# Patient Record
Sex: FEMALE | Race: BLACK OR AFRICAN AMERICAN | Employment: FULL TIME | URBAN - METROPOLITAN AREA
[De-identification: names, ages, dates, MRNs, and addresses within clinical notes are randomized per-mention and may not be internally consistent; named-entity substitution may affect disease eponyms.]

---

## 2017-04-11 DIAGNOSIS — I10 ESSENTIAL HYPERTENSION WITH GOAL BLOOD PRESSURE LESS THAN 140/90: ICD-10-CM

## 2017-04-11 DIAGNOSIS — R73.09 ELEVATED HEMOGLOBIN A1C: Primary | ICD-10-CM

## 2017-04-11 RX ORDER — GLIMEPIRIDE 2 MG/1
2 TABLET ORAL
Qty: 90 TAB | Refills: 0 | Status: SHIPPED | OUTPATIENT
Start: 2017-04-11 | End: 2017-04-13 | Stop reason: SDUPTHER

## 2017-04-13 DIAGNOSIS — R73.09 ELEVATED HEMOGLOBIN A1C: ICD-10-CM

## 2017-04-13 RX ORDER — GLIMEPIRIDE 2 MG/1
2 TABLET ORAL
Qty: 30 TAB | Refills: 0 | Status: SHIPPED | OUTPATIENT
Start: 2017-04-13 | End: 2017-07-06 | Stop reason: SDUPTHER

## 2017-06-19 ENCOUNTER — OFFICE VISIT (OUTPATIENT)
Dept: FAMILY MEDICINE CLINIC | Age: 47
End: 2017-06-19

## 2017-06-19 VITALS
HEART RATE: 102 BPM | BODY MASS INDEX: 39.69 KG/M2 | DIASTOLIC BLOOD PRESSURE: 87 MMHG | TEMPERATURE: 98.8 F | RESPIRATION RATE: 16 BRPM | OXYGEN SATURATION: 97 % | HEIGHT: 63 IN | SYSTOLIC BLOOD PRESSURE: 143 MMHG | WEIGHT: 224 LBS

## 2017-06-19 DIAGNOSIS — N89.8 VAGINAL IRRITATION: Primary | ICD-10-CM

## 2017-06-19 DIAGNOSIS — R82.90 CLOUDY URINE: ICD-10-CM

## 2017-06-19 LAB
BILIRUB UR QL STRIP: NEGATIVE
GLUCOSE UR-MCNC: NEGATIVE MG/DL
KETONES P FAST UR STRIP-MCNC: NORMAL MG/DL
PH UR STRIP: 6 [PH] (ref 4.6–8)
PROT UR QL STRIP: NORMAL MG/DL
SP GR UR STRIP: 1.02 (ref 1–1.03)
UA UROBILINOGEN AMB POC: NORMAL (ref 0.2–1)
URINALYSIS CLARITY POC: CLEAR
URINALYSIS COLOR POC: YELLOW
URINE BLOOD POC: NORMAL
URINE LEUKOCYTES POC: NORMAL
URINE NITRITES POC: NEGATIVE

## 2017-06-19 RX ORDER — FLUCONAZOLE 150 MG/1
150 TABLET ORAL
Qty: 2 TAB | Refills: 0 | Status: SHIPPED | OUTPATIENT
Start: 2017-06-19 | End: 2017-06-23

## 2017-06-19 NOTE — PROGRESS NOTES
Chief Complaint   Patient presents with    Bladder Infection     two weeks.   tried otc meds but did not work

## 2017-06-19 NOTE — PROGRESS NOTES
Subjective  Kathi Su is an 42 y.o. female who presents cloudy urine and vaginal irritation. States that 2 weeks ago, she has whitish vaginal discharge and itching. She took OTC Monistat, one dose. Symptoms improved but did not resolve. Then she started having cloudy urine and her bladder felt achy. Denies fever/chills. Past Medical History - reviewed:  Past Medical History:   Diagnosis Date    Diabetes mellitus     type 2    Diabetes mellitus type 2 in obese [facility]) 2006    Endometriosis     Essential hypertension     Hypertension     Morbid obesity ([address]     Overweight (BMI 25.0-29. 9)          ROS  CONSTITUTIONAL: no fever  CARDIOVASCULAR: no chest pain  RESPIRATORY: no shortness of breath      Physical Exam  Visit Vitals    /87 (BP 1 Location: Right arm, BP Patient Position: Sitting)    Pulse (!) 102    Temp 98.8 °F (37.1 °C) (Oral)    Resp 16    Ht 5' 3\" (1.6 m)    Wt 224 lb (101.6 kg)    SpO2 97%    BMI 39.68 kg/m2       General appearance - alert, well appearing, and in no distress  Chest - clear to auscultation, no wheezes or rhonchi, symmetric air entry  Heart - normal rate, regular rhythm, normal S1, S2, no murmurs  Abdomen - soft, nontender, nondistended   - chaperoned by Kenyon Mejias LPN. Vaginal mucosa pink and moist. Thick, chunky, whitish vaginal discharge present. Assessment/Plan    ICD-10-CM ICD-9-CM    1. Vaginal irritation N89.8 623.9 fluconazole (DIFLUCAN) 150 mg tablet   2. Cloudy urine R82.90 791.9 CULTURE, URINE      AMB POC URINALYSIS DIP STICK AUTO W/O MICRO     Vaginal discharge and itching: Treat for yeast with diflucan. One tab now and then one in 72 hours of symptoms persist.    Cloudy urine: UA remarkable for trace LE, +1 blood , negative nitrites. Send out for culture. Follow-up Disposition:  Return if symptoms worsen or fail to improve. I have discussed the diagnosis with the patient and the intended plan as seen in the above orders. The patient has received an after-visit summary and questions were answered concerning future plans. I have discussed medication side effects and warnings with the patient as well.       Bobo Grijalva MD  Family Medicine Resident

## 2017-06-19 NOTE — PATIENT INSTRUCTIONS

## 2017-06-19 NOTE — MR AVS SNAPSHOT
Visit Information Date & Time Provider Department Dept. Phone Encounter #  
 6/19/2017  6:15 PM Veena Matos, [facility] [phone] [id number] Follow-up Instructions Return if symptoms worsen or fail to improve. Your Appointments 7/6/2017 10:20 AM  
COMPLETE PHYSICAL with Kristopher Figueroa MD  
[facility]) Appt Note: CPE  
 [address] [place] [address] Road  
[phone]  
  
   
 [address] Charu Bentley [address] Upcoming Health Maintenance Date Due Pneumococcal 19-64 Medium Risk (1 of 1 - PPSV23) 11/27/1991 DTaP/Tdap/Td series (1 - Tdap) 11/27/1993 HEMOGLOBIN A1C Q6M 12/14/2016 LIPID PANEL Q1 6/14/2017 INFLUENZA AGE 9 TO ADULT 8/1/2017 PAP AKA CERVICAL CYTOLOGY 7/15/2019 Allergies as of 6/19/2017  Review Complete On: 6/19/2017 By: Venkat Macias MD  
  
 Severity Noted Reaction Type Reactions Shellfish Containing Products High 05/24/2010    Shortness of Breath, Other (comments)  
 laryngeal edema Oxycodone Medium 01/14/2013   Systemic Itching Pt can take lortab Naprosyn [Naproxen]  05/24/2010    Swelling  
 face swells. Takes motrin at home Current Immunizations  Never Reviewed No immunizations on file. Not reviewed this visit You Were Diagnosed With   
  
 Codes Comments Vaginal irritation    -  Primary ICD-10-CM: S83.4 ICD-9-CM: 623.9 Dysuria     ICD-10-CM: R30.0 ICD-9-CM: 652. 1 Vitals BP Pulse Temp Resp Height(growth percentile) Weight(growth percentile) 143/87 (BP 1 Location: Right arm, BP Patient Position: Sitting) (!) 102 98.8 °F (37.1 °C) (Oral) 16 5' 3\" (1.6 m) 224 lb (101.6 kg) SpO2 BMI OB Status Smoking Status 97% 39.68 kg/m2 Having regular periods Never Smoker Vitals History BMI and BSA Data  Body Mass Index Body Surface Area  
 39.68 kg/m 2 2.13 m 2  
  
  
 Preferred Pharmacy Pharmacy Name Phone [facility] [id number] AT [facility] [phone] Your Updated Medication List  
  
   
This list is accurate as of: 6/19/17  6:56 PM.  Always use your most recent med list.  
  
  
  
  
 Blood-Glucose Meter monitoring kit  
by Does Not Apply route. Please supply 1 meter. fluconazole 150 mg tablet Commonly known as:  DIFLUCAN Take 1 Tab by mouth every three (3) days for 2 doses. FDA advises cautious prescribing of oral fluconazole in pregnancy. glimepiride 2 mg tablet Commonly known as:  AMARYL Take 1 Tab by mouth every morning. Appointment needed prior to next refill.  
  
 glucose blood VI test strips strip Commonly known as:  Alton Dueñas Check BS using  test strips with onetouch verio meter  
  
 lisinopril-hydroCHLOROthiazide 20-12.5 mg per tablet Commonly known as:  Pennelope Collet Take 1 Tab by mouth daily. metFORMIN  mg tablet Commonly known as:  GLUCOPHAGE XR Take 3 tabs by mouth daily (one at lunch, two at dinner) Prescriptions Sent to Pharmacy Refills  
 fluconazole (DIFLUCAN) 150 mg tablet 0 Sig: Take 1 Tab by mouth every three (3) days for 2 doses. FDA advises cautious prescribing of oral fluconazole in pregnancy. Class: Normal  
 Pharmacy: [address] Ph #: [phone] Route: Oral  
  
We Performed the Following AMB POC SMEAR, STAIN & Collette Chuck MOUNT [id number] CPT(R)] AMB POC URINALYSIS DIP STICK AUTO W/O MICRO [46688 CPT(R)] CULTURE, URINE [id number] CPT(R)] Follow-up Instructions Return if symptoms worsen or fail to improve. Patient Instructions Vaginal Yeast Infection: Care Instructions Your Care Instructions A vaginal yeast infection is caused by too many yeast cells in the vagina. This is common in women of all ages. Itching, vaginal discharge and irritation, and other symptoms can bother you. But yeast infections don't often cause other health problems. Some medicines can increase your risk of getting a yeast infection. These include antibiotics, birth control pills, hormones, and steroids. You may also be more likely to get a yeast infection if you are pregnant, have diabetes, douche, or wear tight clothes. With treatment, most yeast infections get better in 2 to 3 days. Follow-up care is a key part of your treatment and safety. Be sure to make and go to all appointments, and call your doctor if you are having problems. It's also a good idea to know your test results and keep a list of the medicines you take. How can you care for yourself at home? · Take your medicines exactly as prescribed. Call your doctor if you think you are having a problem with your medicine. · Ask your doctor about over-the-counter (OTC) medicines for yeast infections. They may cost less than prescription medicines. If you use an OTC treatment, read and follow all instructions on the label. · Do not use tampons while using a vaginal cream or suppository. The tampons can absorb the medicine. Use pads instead. · Wear loose cotton clothing. Do not wear nylon or other fabric that holds body heat and moisture close to the skin. · Try sleeping without underwear. · Do not scratch. Relieve itching with a cold pack or a cool bath. · Do not wash your vaginal area more than once a day. Use plain water or a mild, unscented soap. Air-dry the vaginal area. · Change out of wet swimsuits after swimming. · Do not have sex until you have finished your treatment. · Do not douche. When should you call for help? Call your doctor now or seek immediate medical care if: 
· You have unexpected vaginal bleeding. · You have new or increased pain in your vagina or pelvis. Watch closely for changes in your health, and be sure to contact your doctor if: 
· You have a fever. · You are not getting better after 2 days. · Your symptoms come back after you finish your medicines. Where can you learn more? Go to http://dominique-salome.info/. Enter R856 in the search box to learn more about \"Vaginal Yeast Infection: Care Instructions. \" Current as of: October 13, 2016 Content Version: 11.3 © 5589-8371 [employer]. Care instructions adapted under license by [employer] (which disclaims liability or warranty for this information). If you have questions about a medical condition or this instruction, always ask your healthcare professional. [address] any warranty or liability for your use of this information. Introducing \[facility]\"" & HEALTH SERVICES! Dear Shaila Graham: Thank you for requesting a [employer] account. Our records indicate that you already have an active [employer] account. You can access your account anytime at [email or web address]/Site Tour Did you know that you can access your hospital and ER discharge instructions at any time in [employer]? You can also review all of your test results from your hospital stay or ER visit. Additional Information If you have questions, please visit the Frequently Asked Questions section of the [employer] website at [email or web address] Remember, [employer] is NOT to be used for urgent needs. For medical emergencies, dial 911. Now available from your iPhone and Android! Please provide this summary of care documentation to your next provider. Your primary care clinician is listed as [address] [place] [address]. If you have any questions after today's visit, please call [phone].

## 2017-06-21 LAB — BACTERIA UR CULT: NORMAL

## 2017-07-06 ENCOUNTER — OFFICE VISIT (OUTPATIENT)
Dept: FAMILY MEDICINE CLINIC | Age: 47
End: 2017-07-06

## 2017-07-06 VITALS
OXYGEN SATURATION: 99 % | BODY MASS INDEX: 39.51 KG/M2 | TEMPERATURE: 98.5 F | SYSTOLIC BLOOD PRESSURE: 135 MMHG | WEIGHT: 223 LBS | RESPIRATION RATE: 18 BRPM | DIASTOLIC BLOOD PRESSURE: 87 MMHG | HEIGHT: 63 IN | HEART RATE: 91 BPM

## 2017-07-06 DIAGNOSIS — R73.09 ELEVATED HEMOGLOBIN A1C: ICD-10-CM

## 2017-07-06 DIAGNOSIS — I10 ESSENTIAL HYPERTENSION WITH GOAL BLOOD PRESSURE LESS THAN 140/90: ICD-10-CM

## 2017-07-06 DIAGNOSIS — Z01.419 ENCOUNTER FOR WELL WOMAN EXAM WITH ROUTINE GYNECOLOGICAL EXAM: Primary | ICD-10-CM

## 2017-07-06 DIAGNOSIS — E11.9 TYPE 2 DIABETES MELLITUS WITHOUT COMPLICATION, UNSPECIFIED LONG TERM INSULIN USE STATUS: ICD-10-CM

## 2017-07-06 RX ORDER — GLIMEPIRIDE 2 MG/1
2 TABLET ORAL
Qty: 90 TAB | Refills: 2 | Status: SHIPPED | OUTPATIENT
Start: 2017-07-06 | End: 2017-07-18 | Stop reason: SDUPTHER

## 2017-07-06 RX ORDER — LISINOPRIL AND HYDROCHLOROTHIAZIDE 12.5; 2 MG/1; MG/1
1 TABLET ORAL DAILY
Qty: 90 TAB | Refills: 2 | Status: SHIPPED | OUTPATIENT
Start: 2017-07-06 | End: 2018-01-31 | Stop reason: SDUPTHER

## 2017-07-06 NOTE — LETTER
7/10/2017 3:00 PM 
 
Ms. Janet Armas [address] [facility] [address] [id number] Dear Janet Armas: 
 
Please find your most recent results below. Resulted Orders METABOLIC PANEL, COMPREHENSIVE Result Value Ref Range Glucose 152 (H) 65 - 99 mg/dL BUN 9 6 - 24 mg/dL Creatinine 0.66 0.57 - 1.00 mg/dL GFR est non- >59 mL/min/1.73 GFR est  >59 mL/min/1.73  
 BUN/Creatinine ratio 14 9 - 23 Sodium 137 134 - 144 mmol/L Potassium 4.3 3.5 - 5.2 mmol/L Chloride 97 96 - 106 mmol/L  
 CO2 21 18 - 29 mmol/L Calcium 9.8 8.7 - 10.2 mg/dL Protein, total 7.4 6.0 - 8.5 g/dL Albumin 4.1 3.5 - 5.5 g/dL GLOBULIN, TOTAL 3.3 1.5 - 4.5 g/dL A-G Ratio 1.2 1.2 - 2.2 Bilirubin, total 0.4 0.0 - 1.2 mg/dL Alk. phosphatase 55 39 - 117 IU/L  
 AST (SGOT) 22 0 - 40 IU/L  
 ALT (SGPT) 21 0 - 32 IU/L Narrative Performed at:  [address]  [id number] : David Al MD, Phone:  [phone] CBC W/O DIFF Result Value Ref Range WBC 8.4 3.4 - 10.8 x10E3/uL  
 RBC 4.28 3.77 - 5.28 x10E6/uL HGB 13.1 11.1 - 15.9 g/dL HCT 39.2 34.0 - 46.6 % MCV 92 79 - 97 fL  
 MCH 30.6 26.6 - 33.0 pg  
 MCHC 33.4 31.5 - 35.7 g/dL  
 RDW 13.7 12.3 - 15.4 % PLATELET 379 254 - 895 x10E3/uL Narrative Performed at:  [address]  [id number] : David Al MD, Phone:  [phone] LIPID PANEL Result Value Ref Range Cholesterol, total 189 100 - 199 mg/dL Triglyceride 290 (H) 0 - 149 mg/dL HDL Cholesterol 51 >39 mg/dL VLDL, calculated 58 (H) 5 - 40 mg/dL LDL, calculated 80 0 - 99 mg/dL Narrative Performed at:  [address]  [id number] : David Al MD, Phone:  [phone] HEMOGLOBIN A1C WITH EAG Result Value Ref Range Hemoglobin A1c 7.8 (H) 4.8 - 5.6 % Comment:  
            Pre-diabetes: 5.7 - 6.4 Diabetes: >6.4 Glycemic control for adults with diabetes: <7.0 Estimated average glucose 177 mg/dL Narrative Performed at:  [address]  [id number] : Sumaya Islas MD, Phone:  [phone] TSH 3RD GENERATION Result Value Ref Range TSH 1.160 0.450 - 4.500 uIU/mL Narrative Performed at:  [address]  [id number] : Sumaya Islas MD, Phone:  [phone] CVD REPORT Result Value Ref Range INTERPRETATION Note Comment:  
   Supplement report is available. Narrative Performed at:  [facility] [place] [address]  [id number] : Caroline Andrade PhD, Phone:  [phone] DIABETES PATIENT EDUCATION Result Value Ref Range PDF Image Not applicable Narrative Performed at:  [address] [place] [address]  [id number] : Caroline Andrade PhD, Phone:  [phone] RECOMMENDATIONS: 
 
 
Elevated glucose Normal kidney function Normal liver function No anemia Elevated triglycerides -- need to decrease fat intake and increase fiber intake Hgb A1C = 7.8 -- highest it has been in a while -- need to watch diet -- could increase metformin or her other med -- what would you like to do? Normal thyroid function Please call me if you have any questions: [phone] Sincerely, Tre Darby MD

## 2017-07-06 NOTE — PROGRESS NOTES
HPI       Jb Pike is a 42 y.o. female who presents for an annual exam.     Yeast Infx - Pt in clinic 6/19 with a yeast infection, symptoms have all resolved. Diabetes - wants to discuss better diabetes management. Has very busy lifestyle, meals are very sporadic, does not get regular exercise. Checks sugars at least once a day, usually in the 180s, but this past week, has had a lot of high BGs (325, 200s). This morning was 150 before breakfast. Previously saw Portillo Arellano as her endocrinologist but now only sees Dr. Nayla Luther. Has a lot of sinus issues recently, colds and seasonal allergies, says that usually elevates her BG. Denies vision changes, no heart palpitations. - Last A1C: 7.5 (6/14/2016), 7.3 (11/7/2014)    Hypertension - does not do home checks, does have a cuff at home. Encouraged checking BP at home. Endorses having headaches chronically, has not noted any increase in frequency related to BGs. Does not always take lisinopril-HCTZ every day because of the diuretic, says she takes it about 5/7 days of the week. Gyn - Hx of menorrhagia, endometriosis, pt has regular periods, s/p endometrial ablation, continues to have back pains and \"uterus achiness,\" centrally located, while on her period. Well Woman Exam-  - Last mammogram 6/28/2016, normal  - Last Pap 7/15/2016 negative  - No hx of abnormal PAP smears    Health Maintenance  - Immunizations - discussed pneumovax, pt eligible due to DMII, pt will think about it  - Pt asked about colonoscopies, discussed recommendations, pt to wait until age 50    PMHx:  Past Medical History:   Diagnosis Date    Diabetes mellitus     type 2    Diabetes mellitus type 2 in obese [facility]) 2006    Endometriosis     Essential hypertension     Hypertension     Morbid obesity ([address]     Overweight (BMI 25.0-29. 9)      Meds:   Current Outpatient Prescriptions   Medication Sig Dispense Refill    glimepiride (AMARYL) 2 mg tablet Take 1 Tab by mouth every morning. Appointment needed prior to next refill. 30 Tab 0    metFORMIN ER (GLUCOPHAGE XR) 500 mg tablet Take 3 tabs by mouth daily (one at lunch, two at dinner) 90 Tab 0    lisinopril-hydrochlorothiazide (PRINZIDE, ZESTORETIC) 20-12.5 mg per tablet Take 1 Tab by mouth daily. 90 Tab 2    glucose blood VI test strips (ONETOUCH VERIO) strip Check BS using  test strips with onetouch verio meter 100 Strip 2    Blood-Glucose Meter monitoring kit by Does Not Apply route. Please supply 1 meter. 1 Kit 0     Allergies: Allergies   Allergen Reactions    Shellfish Containing Products Shortness of Breath and Other (comments)     laryngeal edema    Oxycodone Itching     Pt can take lortab    Naprosyn [Naproxen] Swelling     face swells. Takes motrin at home        Smoker:  History   Smoking Status    Never Smoker   Smokeless Tobacco    Never Used     ETOH:   History   Alcohol Use No     FH:   Family History   Problem Relation Age of Onset    Diabetes Mother     Hypertension Mother     High Cholesterol Mother     Hypertension Father     High Cholesterol Father     Diabetes Maternal Grandmother     Breast Cancer Maternal Aunt     Breast Cancer Maternal Aunt     Colon Cancer Neg Hx     Ovarian Cancer Neg Hx      ROS:  Review of Systems   Constitutional: Negative for fatigue and fever. HENT: Negative. Negative for sinus pressure, sneezing and sore throat. Respiratory: Negative for cough and shortness of breath. Cardiovascular: Negative for chest pain and palpitations. Gastrointestinal: Negative for abdominal pain, constipation, diarrhea, nausea and vomiting. Endocrine: Negative for polyuria. Genitourinary: Negative for difficulty urinating, dysuria, urgency and vaginal discharge. Musculoskeletal: Negative for back pain. Neurological: Negative for dizziness, light-headedness and headaches.      Physical Exam:  Visit Vitals    /87 (BP 1 Location: Right arm, BP Patient Position: Sitting)    Pulse 91    Temp 98.5 °F (36.9 °C) (Oral)    Resp 18    Ht 5' 3\" (1.6 m)    Wt 223 lb (101.2 kg)    LMP 06/05/2017    SpO2 99%    BMI 39.5 kg/m2       Wt Readings from Last 3 Encounters:   07/06/17 223 lb (101.2 kg)   06/19/17 224 lb (101.6 kg)   07/15/16 223 lb 8 oz (101.4 kg)     BP Readings from Last 3 Encounters:   07/06/17 135/87   06/19/17 143/87   07/15/16 124/85      Physical Exam   Constitutional: She is oriented to person, place, and time. She appears well-developed and well-nourished. HENT:   Head: Normocephalic and atraumatic. Eyes: Conjunctivae and EOM are normal.   Neck: Normal range of motion. Neck supple. Cardiovascular: Normal rate, regular rhythm and normal heart sounds. Pulmonary/Chest: Effort normal and breath sounds normal.   Abdominal: Soft. Bowel sounds are normal.   Genitourinary:   Genitourinary Comments: deferred   Musculoskeletal: Normal range of motion. Neurological: She is alert and oriented to person, place, and time. Vitals reviewed. Breast exam -- normal, no masses, no tenderness, no dimpling, no retractions         Assessment     42 y.o. female with:    ICD-10-CM ICD-9-CM    1. Encounter for well woman exam with routine gynecological exam Z01.419 V72.31 [facility] 3D [facility] W MAMMO BI SCREENING INCL CAD      METABOLIC PANEL, COMPREHENSIVE      CBC W/O DIFF      LIPID PANEL              Plan       Orders Placed This Encounter    [facility] W MAMMO BI SCREENING INCL CAD    METABOLIC PANEL, COMPREHENSIVE    CBC W/O DIFF    LIPID PANEL     Diabetes  - Last A1C 7.5  - Check A1C today  - Continue glimeperide 2mg daily and Metformin 500mg, 1 at lunch and 2 at dinner --> will adjust after we receive A1C results  - Discussed diet/exercise, encouraged checking BG more regularly    Hypertension  - continue lisinopril-HCTZ 20-12.5mg/tablet  - encouraged checking at home    Health Maintenance  - Last Pap one year ago, not needed yet.  Pt on period, pelvic exam deferred. - Mammogram ordered  - Labs: CMP, CBC, lipid panel, TSH  - Breast Exam performed by Dr. Brandie Blackwood [id number] at next visit    Patient discussed and seen with Dr. Debi Jerez    I have discussed the diagnosis with the patient and the intended plan as seen in the above orders. The patient has received an after-visit summary and questions were answered concerning future plans. I have discussed medication side effects and warnings with the patient as well. Padma Lawson MD  Family Medicine Resident, PGY-1    I saw and evaluated the patient, performing the key elements of the service. I discussed the findings, assessment and plan with the resident and agree with the resident's findings and plan as documented in the resident's note.

## 2017-07-06 NOTE — PROGRESS NOTES
Chief Complaint   Patient presents with    Well Woman     1. Have you been to the ER, urgent care clinic since your last visit? Hospitalized since your last visit? No    2. Have you seen or consulted any other health care providers outside of the [address] [place] [address] since your last visit? Include any pap smears or colon screening.  No

## 2017-07-07 LAB
ALBUMIN SERPL-MCNC: 4.1 G/DL (ref 3.5–5.5)
ALBUMIN/GLOB SERPL: 1.2 {RATIO} (ref 1.2–2.2)
ALP SERPL-CCNC: 55 IU/L (ref 39–117)
ALT SERPL-CCNC: 21 IU/L (ref 0–32)
AST SERPL-CCNC: 22 IU/L (ref 0–40)
BILIRUB SERPL-MCNC: 0.4 MG/DL (ref 0–1.2)
BUN SERPL-MCNC: 9 MG/DL (ref 6–24)
BUN/CREAT SERPL: 14 (ref 9–23)
CALCIUM SERPL-MCNC: 9.8 MG/DL (ref 8.7–10.2)
CHLORIDE SERPL-SCNC: 97 MMOL/L (ref 96–106)
CHOLEST SERPL-MCNC: 189 MG/DL (ref 100–199)
CO2 SERPL-SCNC: 21 MMOL/L (ref 18–29)
CREAT SERPL-MCNC: 0.66 MG/DL (ref 0.57–1)
ERYTHROCYTE [DISTWIDTH] IN BLOOD BY AUTOMATED COUNT: 13.7 % (ref 12.3–15.4)
EST. AVERAGE GLUCOSE BLD GHB EST-MCNC: 177 MG/DL
GLOBULIN SER CALC-MCNC: 3.3 G/DL (ref 1.5–4.5)
GLUCOSE SERPL-MCNC: 152 MG/DL (ref 65–99)
HBA1C MFR BLD: 7.8 % (ref 4.8–5.6)
HCT VFR BLD AUTO: 39.2 % (ref 34–46.6)
HDLC SERPL-MCNC: 51 MG/DL
HGB BLD-MCNC: 13.1 G/DL (ref 11.1–15.9)
INTERPRETATION, 910389: NORMAL
LDLC SERPL CALC-MCNC: 80 MG/DL (ref 0–99)
Lab: NORMAL
MCH RBC QN AUTO: 30.6 PG (ref 26.6–33)
MCHC RBC AUTO-ENTMCNC: 33.4 G/DL (ref 31.5–35.7)
MCV RBC AUTO: 92 FL (ref 79–97)
PLATELET # BLD AUTO: 296 X10E3/UL (ref 150–379)
POTASSIUM SERPL-SCNC: 4.3 MMOL/L (ref 3.5–5.2)
PROT SERPL-MCNC: 7.4 G/DL (ref 6–8.5)
RBC # BLD AUTO: 4.28 X10E6/UL (ref 3.77–5.28)
SODIUM SERPL-SCNC: 137 MMOL/L (ref 134–144)
TRIGL SERPL-MCNC: 290 MG/DL (ref 0–149)
TSH SERPL DL<=0.005 MIU/L-ACNC: 1.16 UIU/ML (ref 0.45–4.5)
VLDLC SERPL CALC-MCNC: 58 MG/DL (ref 5–40)
WBC # BLD AUTO: 8.4 X10E3/UL (ref 3.4–10.8)

## 2017-07-09 NOTE — PROGRESS NOTES
Elevated glucose  Normal kidney function  Normal liver function  No anemia  Elevated triglycerides -- need to decrease fat intake and increase fiber intake  Hgb A1C = 7.8 -- highest it has been in a while -- need to watch diet -- could increase metformin or her other med -- what would she like to do?   Normal thyroid function

## 2017-08-18 DIAGNOSIS — E11.9 TYPE 2 DIABETES MELLITUS WITHOUT COMPLICATION, WITHOUT LONG-TERM CURRENT USE OF INSULIN (HCC): Primary | ICD-10-CM

## 2017-08-18 RX ORDER — METFORMIN HYDROCHLORIDE 500 MG/1
500 TABLET, EXTENDED RELEASE ORAL
Qty: 90 TAB | Refills: 3 | Status: SHIPPED | OUTPATIENT
Start: 2017-08-18 | End: 2018-01-12 | Stop reason: SDUPTHER

## 2017-12-29 ENCOUNTER — HOSPITAL ENCOUNTER (OUTPATIENT)
Dept: MAMMOGRAPHY | Age: 47
Discharge: HOME | End: 2017-12-29
Payer: COMMERCIAL

## 2017-12-29 DIAGNOSIS — Z12.39 SCREENING BREAST EXAMINATION: ICD-10-CM

## 2017-12-29 PROCEDURE — 77067 SCR MAMMO BI INCL CAD: CPT

## 2018-01-12 DIAGNOSIS — E11.8 CONTROLLED DIABETES MELLITUS TYPE 2 WITH COMPLICATIONS, UNSPECIFIED LONG TERM INSULIN USE STATUS: Primary | ICD-10-CM

## 2018-01-12 RX ORDER — METFORMIN HYDROCHLORIDE 500 MG/1
TABLET, EXTENDED RELEASE ORAL
Qty: 30 TAB | Refills: 1 | Status: SHIPPED | OUTPATIENT
Start: 2018-01-12 | End: 2018-01-31 | Stop reason: SDUPTHER

## 2018-01-15 ENCOUNTER — LAB ONLY (OUTPATIENT)
Dept: FAMILY MEDICINE CLINIC | Age: 48
End: 2018-01-15

## 2018-01-15 NOTE — MR AVS SNAPSHOT
Visit Information Date & Time Provider Department Dept. Phone Encounter #  
 1/15/2018  9:30 AM LAB [facility] [place] [facility] [phone] [id number] Your Appointments 1/24/2018  9:50 AM  
ROUTINE CARE with Renato Camacho MD  
[facility] [place] [facility] ([address]) Appt Note: Follow up per Dr. Ever Camarena [address] [place] [address] [address] [place] [address]  
[phone]  
  
   
 [address] [place] [id number] Upcoming Health Maintenance Date Due Pneumococcal 19-64 Medium Risk (1 of 1 - PPSV23) 11/27/1991 DTaP/Tdap/Td series (1 - Tdap) 11/27/1993 Influenza Age 5 to Adult 8/1/2017 HEMOGLOBIN A1C Q6M 1/6/2018 LIPID PANEL Q1 7/6/2018 PAP AKA CERVICAL CYTOLOGY 7/15/2019 Allergies as of 1/15/2018  Review Complete On: 7/6/2017 By: Renato Camacho MD  
  
 Severity Noted Reaction Type Reactions Shellfish Containing Products High 05/24/2010    Shortness of Breath, Other (comments)  
 laryngeal edema Oxycodone Medium 01/14/2013   Systemic Itching Pt can take lortab Naprosyn [Naproxen]  05/24/2010    Swelling  
 face swells. Takes motrin at home Current Immunizations  Never Reviewed No immunizations on file. Not reviewed this visit Vitals LMP OB Status Smoking Status 12/22/2017 Having regular periods Never Smoker Preferred Pharmacy Pharmacy Name Phone [id number] [address] AT [facility] [phone] Your Updated Medication List  
  
   
This list is accurate as of: 1/15/18 10:25 AM.  Always use your most recent med list.  
  
  
  
  
 Blood-Glucose Meter monitoring kit  
by Does Not Apply route. Please supply 1 meter. glimepiride 2 mg tablet Commonly known as:  AMARYL Take 1 Tab by mouth every morning. glucose blood VI test strips strip Commonly known as:  Thalia Levy  
 Check BS using  test strips with onetouch verio meter  
  
 lisinopril-hydroCHLOROthiazide 20-12.5 mg per tablet Commonly known as:  Hessie Kaur Take 1 Tab by mouth daily. metFORMIN  mg tablet Commonly known as:  GLUCOPHAGE XR  
TAKE 1 TAB BY MOUTH DAILY (WITH DINNER). TAKE ONE TAB WITH LUNCH AND TWO TABS WITH DINNER Introducing [facility] & HEALTH SERVICES! Dear Kamlesh Cedillo: Thank you for requesting a [employer] account. Our records indicate that you already have an active [employer] account. You can access your account anytime at [email or web address] Did you know that you can access your hospital and ER discharge instructions at any time in [employer]? You can also review all of your test results from your hospital stay or ER visit. Additional Information If you have questions, please visit the Frequently Asked Questions section of the [employer] website at [email or web address] Remember, [employer] is NOT to be used for urgent needs. For medical emergencies, dial 911. Now available from your iPhone and Android! Please provide this summary of care documentation to your next provider. Your primary care clinician is listed as [address] [place] [address]. If you have any questions after today's visit, please call [phone].

## 2018-01-16 LAB
ALBUMIN SERPL-MCNC: 3.7 G/DL (ref 3.5–5.5)
ALBUMIN/GLOB SERPL: 1.1 {RATIO} (ref 1.2–2.2)
ALP SERPL-CCNC: 50 IU/L (ref 39–117)
ALT SERPL-CCNC: 11 IU/L (ref 0–32)
AST SERPL-CCNC: 11 IU/L (ref 0–40)
BILIRUB SERPL-MCNC: 0.4 MG/DL (ref 0–1.2)
BUN SERPL-MCNC: 10 MG/DL (ref 6–24)
BUN/CREAT SERPL: 15 (ref 9–23)
CALCIUM SERPL-MCNC: 9.1 MG/DL (ref 8.7–10.2)
CHLORIDE SERPL-SCNC: 101 MMOL/L (ref 96–106)
CHOLEST SERPL-MCNC: 202 MG/DL (ref 100–199)
CO2 SERPL-SCNC: 22 MMOL/L (ref 18–29)
CREAT SERPL-MCNC: 0.66 MG/DL (ref 0.57–1)
EST. AVERAGE GLUCOSE BLD GHB EST-MCNC: 163 MG/DL
GLOBULIN SER CALC-MCNC: 3.3 G/DL (ref 1.5–4.5)
GLUCOSE SERPL-MCNC: 167 MG/DL (ref 65–99)
HBA1C MFR BLD: 7.3 % (ref 4.8–5.6)
HDLC SERPL-MCNC: 61 MG/DL
INTERPRETATION, 910389: NORMAL
LDLC SERPL CALC-MCNC: 109 MG/DL (ref 0–99)
Lab: NORMAL
POTASSIUM SERPL-SCNC: 4.1 MMOL/L (ref 3.5–5.2)
PROT SERPL-MCNC: 7 G/DL (ref 6–8.5)
SODIUM SERPL-SCNC: 138 MMOL/L (ref 134–144)
TRIGL SERPL-MCNC: 160 MG/DL (ref 0–149)
VLDLC SERPL CALC-MCNC: 32 MG/DL (ref 5–40)

## 2018-01-31 ENCOUNTER — OFFICE VISIT (OUTPATIENT)
Dept: FAMILY MEDICINE CLINIC | Age: 48
End: 2018-01-31

## 2018-01-31 VITALS
BODY MASS INDEX: 38.8 KG/M2 | WEIGHT: 219 LBS | SYSTOLIC BLOOD PRESSURE: 149 MMHG | TEMPERATURE: 98.9 F | HEART RATE: 108 BPM | OXYGEN SATURATION: 97 % | HEIGHT: 63 IN | DIASTOLIC BLOOD PRESSURE: 91 MMHG | RESPIRATION RATE: 20 BRPM

## 2018-01-31 DIAGNOSIS — E11.9 TYPE 2 DIABETES MELLITUS WITHOUT COMPLICATION, UNSPECIFIED LONG TERM INSULIN USE STATUS: ICD-10-CM

## 2018-01-31 DIAGNOSIS — E11.9 TYPE 2 DIABETES MELLITUS WITHOUT COMPLICATION, WITHOUT LONG-TERM CURRENT USE OF INSULIN (HCC): Primary | ICD-10-CM

## 2018-01-31 DIAGNOSIS — R73.09 ELEVATED HEMOGLOBIN A1C: ICD-10-CM

## 2018-01-31 DIAGNOSIS — I10 ESSENTIAL HYPERTENSION WITH GOAL BLOOD PRESSURE LESS THAN 140/90: ICD-10-CM

## 2018-01-31 DIAGNOSIS — F40.243 FEAR OF FLYING: ICD-10-CM

## 2018-01-31 DIAGNOSIS — Z01.419 ENCOUNTER FOR WELL WOMAN EXAM WITH ROUTINE GYNECOLOGICAL EXAM: ICD-10-CM

## 2018-01-31 RX ORDER — PREDNISONE 20 MG/1
TABLET ORAL
COMMUNITY
End: 2019-05-28

## 2018-01-31 RX ORDER — METFORMIN HYDROCHLORIDE 500 MG/1
TABLET, EXTENDED RELEASE ORAL
Qty: 270 TAB | Refills: 3 | Status: SHIPPED | OUTPATIENT
Start: 2018-01-31 | End: 2019-04-13 | Stop reason: SDUPTHER

## 2018-01-31 RX ORDER — ALPRAZOLAM 0.5 MG/1
0.5 TABLET ORAL
Qty: 12 TAB | Refills: 0 | Status: SHIPPED | OUTPATIENT
Start: 2018-01-31 | End: 2019-05-28

## 2018-01-31 RX ORDER — LISINOPRIL AND HYDROCHLOROTHIAZIDE 12.5; 2 MG/1; MG/1
1 TABLET ORAL DAILY
Qty: 90 TAB | Refills: 2 | Status: SHIPPED | OUTPATIENT
Start: 2018-01-31 | End: 2019-04-25 | Stop reason: SDUPTHER

## 2018-01-31 RX ORDER — AMOXICILLIN AND CLAVULANATE POTASSIUM 500; 125 MG/1; MG/1
TABLET, FILM COATED ORAL
COMMUNITY
End: 2019-05-28

## 2018-01-31 RX ORDER — GLIMEPIRIDE 2 MG/1
2 TABLET ORAL
Qty: 90 TAB | Refills: 3 | Status: SHIPPED | OUTPATIENT
Start: 2018-01-31 | End: 2019-04-15 | Stop reason: SDUPTHER

## 2018-01-31 NOTE — MR AVS SNAPSHOT
[address] 
[phone] Patient: Angela Belle MRN: [id number] :[id number] Visit Information Date & Time Provider Department Dept. Phone Encounter #  
 2018  3:50 PM [facility] [phone] [id number] Upcoming Health Maintenance Date Due Pneumococcal 19-64 Medium Risk (1 of 1 - PPSV23) 1991 DTaP/Tdap/Td series (1 - Tdap) 1993 Influenza Age 5 to Adult 2017 HEMOGLOBIN A1C Q6M 7/15/2018 LIPID PANEL Q1 1/15/2019 PAP AKA CERVICAL CYTOLOGY 7/15/2019 Allergies as of 2018  Review Complete On: 2018 By: Deanne Moss LPN Severity Noted Reaction Type Reactions Shellfish Containing Products High 2010    Shortness of Breath, Other (comments)  
 laryngeal edema Oxycodone Medium 2013   Systemic Itching Pt can take lortab Naprosyn [Naproxen]  2010    Swelling  
 face swells. Takes motrin at home Current Immunizations  Never Reviewed No immunizations on file. Not reviewed this visit Vitals BP Pulse Temp Resp Height(growth percentile) Weight(growth percentile) (!) 149/91 (BP 1 Location: Right arm, BP Patient Position: Sitting) (!) 108 98.9 °F (37.2 °C) (Oral) 20 5' 3\" (1.6 m) 219 lb (99.3 kg) LMP SpO2 BMI OB Status Smoking Status 2018 97% 38.79 kg/m2 Having regular periods Never Smoker BMI and BSA Data Body Mass Index Body Surface Area 38.79 kg/m 2 2.1 m 2 Preferred Pharmacy Pharmacy Name Phone [id number] [place], [address] AT [facility] [phone] Your Updated Medication List  
  
   
This list is accurate as of: 18  4:35 PM.  Always use your most recent med list.  
  
  
  
  
 AUGMENTIN 500-125 mg per tablet Generic drug:  amoxicillin-clavulanate Take  by mouth. Blood-Glucose Meter monitoring kit  
by Does Not Apply route. Please supply 1 meter. glimepiride 2 mg tablet Commonly known as:  AMARYL Take 1 Tab by mouth every morning. glucose blood VI test strips strip Commonly known as:  Trinidad Barley Check BS using  test strips with onetouch verio meter  
  
 lisinopril-hydroCHLOROthiazide 20-12.5 mg per tablet Commonly known as:  Parish Munch Take 1 Tab by mouth daily. metFORMIN  mg tablet Commonly known as:  GLUCOPHAGE XR  
TAKE 1 TAB BY MOUTH DAILY (WITH DINNER). TAKE ONE TAB WITH LUNCH AND TWO TABS WITH DINNER  
  
 predniSONE 20 mg tablet Commonly known as:  Valma Belts Take  by mouth daily (with breakfast). Introducing [facility]! Dear Jimmie Ronquillo: Thank you for requesting a [facility] account. Our records indicate that you already have an active [facility] account. You can access your account anytime at [email or web address] Did you know that you can access your hospital and ER discharge instructions at any time in [facility]? You can also review all of your test results from your hospital stay or ER visit. Additional Information If you have questions, please visit the Frequently Asked Questions section of the [facility] website at [email or web address] Remember, [facility] is NOT to be used for urgent needs. For medical emergencies, dial 911. Now available from your iPhone and Android! Please provide this summary of care documentation to your next provider. Your primary care clinician is listed as [address] [place] [address]. If you have any questions after today's visit, please call [phone].

## 2018-01-31 NOTE — PROGRESS NOTES
Lab Results   Component Value Date/Time    Hemoglobin A1c 7.3 01/15/2018 09:52 AM    Hemoglobin A1c (POC) 7.3 11/07/2014 11:00 AM     No results found for: MCACR, MCA1, MCA2, MCA3, MCAU, MCAU2, MCALPOCT  Lab Results   Component Value Date/Time    LDL, calculated 109 01/15/2018 09:52 AM     BP Readings from Last 1 Encounters:   01/31/18 (!) 149/91     Health Maintenance   Topic Date Due    Pneumococcal 19-64 Medium Risk (1 of 1 - PPSV23) 11/27/1991    DTaP/Tdap/Td series (1 - Tdap) 11/27/1993    Influenza Age 5 to Adult  08/01/2017    HEMOGLOBIN A1C Q6M  07/15/2018    LIPID PANEL Q1  01/15/2019    PAP AKA CERVICAL CYTOLOGY  07/15/2019     Presents for diabetes followup and medication refill    States that she has electrical shocks in her feet    States that she is being treated for URI  Currently she is taking augmentin and decadron -- states decadron is making her feel better but is making her blood glucose out of control     States that she has chest congestion -- tested negative for flu    Blood glucose was 443     Denies having a fever    States that she had \"hot spots\" on her arms and legs -- states way older folks would describe when an infection is moving about your body    States that she now has two grandchildren    Wants to get her blood glucose under control     Long discussion regarding the importance of maintaining her blood glucose under control, diet, exercise, and weight loss. Encouraged her to see weight  and to follow up with Dr. Davis Kulkarni, endocrinologist, who she saw when she was pregnant and required insulin. 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 States that she gets very anxious about it.   Provided with rx of xanax to use when she flies. Advised her that it may maker her drowsy so be careful the first time she uses it until she knows how it is going to effect her.

## 2018-01-31 NOTE — PROGRESS NOTES
Chief Complaint   Patient presents with    Diabetes     1. Have you been to the ER, urgent care clinic since your last visit? Hospitalized since your last visit? yes, [facility],1/17/18, sinus infection    2. Have you seen or consulted any other health care providers outside of the [address] [place] [address] since your last visit? Include any pap smears or colon screening.  No

## 2018-08-08 ENCOUNTER — TELEPHONE (OUTPATIENT)
Dept: FAMILY MEDICINE CLINIC | Age: 48
End: 2018-08-08

## 2018-08-08 DIAGNOSIS — R73.09 ELEVATED HEMOGLOBIN A1C: ICD-10-CM

## 2018-08-08 DIAGNOSIS — Z01.419 ENCOUNTER FOR WELL WOMAN EXAM WITH ROUTINE GYNECOLOGICAL EXAM: ICD-10-CM

## 2018-08-08 DIAGNOSIS — I10 ESSENTIAL HYPERTENSION WITH GOAL BLOOD PRESSURE LESS THAN 140/90: ICD-10-CM

## 2018-08-08 NOTE — TELEPHONE ENCOUNTER
----- Message from Alexei Arnold sent at 8/8/2018  6:43 PM EDT -----  Regarding: Dr. Michael Boykin,    Pt states she have called several times to get a refill for her test strips, of which she is currently out of and is having to pay out of pocket. Pt states either of the physicians could refill the prescription. [facility], Ph. [phone]. Best contact number is [phone].     Thanks,  Inga Garcia

## 2018-08-10 DIAGNOSIS — R73.09 ELEVATED HEMOGLOBIN A1C: ICD-10-CM

## 2019-02-18 ENCOUNTER — HOSPITAL ENCOUNTER (OUTPATIENT)
Dept: MAMMOGRAPHY | Age: 49
Discharge: HOME | End: 2019-02-18
Attending: FAMILY MEDICINE
Payer: COMMERCIAL

## 2019-02-18 DIAGNOSIS — Z12.39 SCREENING BREAST EXAMINATION: ICD-10-CM

## 2019-02-18 PROCEDURE — 77067 SCR MAMMO BI INCL CAD: CPT

## 2019-04-15 DIAGNOSIS — R73.09 ELEVATED HEMOGLOBIN A1C: ICD-10-CM

## 2019-04-15 RX ORDER — GLIMEPIRIDE 2 MG/1
2 TABLET ORAL
Qty: 30 TAB | Refills: 0 | Status: SHIPPED | OUTPATIENT
Start: 2019-04-15 | End: 2019-04-25 | Stop reason: SDUPTHER

## 2019-04-15 NOTE — TELEPHONE ENCOUNTER
Patient has not been seen in over a year  Needs appointment before next refill  Refill for one month to allow time to make an appointment

## 2019-04-15 NOTE — TELEPHONE ENCOUNTER
----- Message from Mau Waters sent at 4/15/2019  7:06 AM EDT -----  Regarding: Dr. Sherrill Ruth  Pt requested a refill on Rx(\"Glimepiride\") called to [facility] on [facility] [phone])  Pt stated she is completely out of medication. Best contact number [phone].

## 2019-04-16 NOTE — TELEPHONE ENCOUNTER
4/16/19, LVM, per Dr. Catina Velez, last seen Jan 2018, appt needed for further refills. Check her schedule first and if full, offer another provider,  cm--------------    Per nurse response:  Moises Carmichael LPN                Per    Patient has not been seen in over a year   Needs appointment before next refill   Refill for one month to allow time to make an appointment   Please call and offer patient an appt.  Thanks

## 2019-04-24 ENCOUNTER — TELEPHONE (OUTPATIENT)
Dept: FAMILY MEDICINE CLINIC | Age: 49
End: 2019-04-24

## 2019-04-24 NOTE — TELEPHONE ENCOUNTER
, patient has an appointment with you on 5/28, What labs would you like drawn on her.  I can put them in and call her back if you let me know what you would like,( I didn't know if you just wanted basic labs )    Thanks, Binh Carrasco

## 2019-04-24 NOTE — TELEPHONE ENCOUNTER
Patient have scheduled appt for physical of 5/28/19 which was 1st available. Patient asking for labs prior and asking that lab order be placed. Patient also notes that you provided her with a month supply on medication and which she will run out prior to cpe. I did tell patient to call a week prior to running out of medication and that a message would be sent to ask for a emergency supply.       thanks

## 2019-04-25 DIAGNOSIS — R73.09 ELEVATED HEMOGLOBIN A1C: ICD-10-CM

## 2019-04-25 DIAGNOSIS — I10 ESSENTIAL HYPERTENSION WITH GOAL BLOOD PRESSURE LESS THAN 140/90: ICD-10-CM

## 2019-04-25 DIAGNOSIS — I10 ESSENTIAL HYPERTENSION: ICD-10-CM

## 2019-04-25 DIAGNOSIS — Z01.419 ENCOUNTER FOR WELL WOMAN EXAM WITH ROUTINE GYNECOLOGICAL EXAM: ICD-10-CM

## 2019-04-25 RX ORDER — GLIMEPIRIDE 2 MG/1
2 TABLET ORAL
Qty: 30 TAB | Refills: 0 | Status: SHIPPED | OUTPATIENT
Start: 2019-04-25 | End: 2019-05-28 | Stop reason: SDUPTHER

## 2019-04-25 RX ORDER — LISINOPRIL AND HYDROCHLOROTHIAZIDE 12.5; 2 MG/1; MG/1
1 TABLET ORAL DAILY
Qty: 90 TAB | Refills: 2 | Status: SHIPPED | OUTPATIENT
Start: 2019-04-25 | End: 2019-05-28 | Stop reason: SDUPTHER

## 2019-04-25 RX ORDER — METFORMIN HYDROCHLORIDE 500 MG/1
TABLET, EXTENDED RELEASE ORAL
Qty: 90 TAB | Refills: 0 | Status: SHIPPED | OUTPATIENT
Start: 2019-04-25 | End: 2019-05-28

## 2019-04-25 NOTE — TELEPHONE ENCOUNTER
Called and left message for the patient to return call. Calling to inform the patient that labs were now in the system.

## 2019-04-25 NOTE — TELEPHONE ENCOUNTER
Patient returned call. I have relayed that the lab orders have been placed and the med refills were sent to [facility]. Patient verbalized understanding.

## 2019-05-20 ENCOUNTER — LAB ONLY (OUTPATIENT)
Dept: FAMILY MEDICINE CLINIC | Age: 49
End: 2019-05-20

## 2019-05-21 LAB
ALBUMIN SERPL-MCNC: 4.1 G/DL (ref 3.5–5.5)
ALBUMIN/CREAT UR: 326.1 MG/G CREAT (ref 0–30)
ALBUMIN/GLOB SERPL: 1.4 {RATIO} (ref 1.2–2.2)
ALP SERPL-CCNC: 55 IU/L (ref 39–117)
ALT SERPL-CCNC: 15 IU/L (ref 0–32)
AST SERPL-CCNC: 15 IU/L (ref 0–40)
BILIRUB SERPL-MCNC: 0.4 MG/DL (ref 0–1.2)
BUN SERPL-MCNC: 9 MG/DL (ref 6–24)
BUN/CREAT SERPL: 12 (ref 9–23)
CALCIUM SERPL-MCNC: 9.9 MG/DL (ref 8.7–10.2)
CHLORIDE SERPL-SCNC: 102 MMOL/L (ref 96–106)
CHOLEST SERPL-MCNC: 225 MG/DL (ref 100–199)
CO2 SERPL-SCNC: 23 MMOL/L (ref 20–29)
CREAT SERPL-MCNC: 0.75 MG/DL (ref 0.57–1)
CREAT UR-MCNC: 80.2 MG/DL
EST. AVERAGE GLUCOSE BLD GHB EST-MCNC: 180 MG/DL
GLOBULIN SER CALC-MCNC: 3 G/DL (ref 1.5–4.5)
GLUCOSE SERPL-MCNC: 170 MG/DL (ref 65–99)
HBA1C MFR BLD: 7.9 % (ref 4.8–5.6)
HDLC SERPL-MCNC: 56 MG/DL
INTERPRETATION, 910389: NORMAL
LDLC SERPL CALC-MCNC: 134 MG/DL (ref 0–99)
Lab: NORMAL
MICROALBUMIN UR-MCNC: 261.5 UG/ML
POTASSIUM SERPL-SCNC: 4.3 MMOL/L (ref 3.5–5.2)
PROT SERPL-MCNC: 7.1 G/DL (ref 6–8.5)
SODIUM SERPL-SCNC: 141 MMOL/L (ref 134–144)
TRIGL SERPL-MCNC: 176 MG/DL (ref 0–149)
VLDLC SERPL CALC-MCNC: 35 MG/DL (ref 5–40)

## 2019-05-28 ENCOUNTER — HOSPITAL ENCOUNTER (OUTPATIENT)
Dept: LAB | Age: 49
Discharge: HOME | End: 2019-05-28
Payer: COMMERCIAL

## 2019-05-28 ENCOUNTER — OFFICE VISIT (OUTPATIENT)
Dept: FAMILY MEDICINE CLINIC | Age: 49
End: 2019-05-28

## 2019-05-28 VITALS
OXYGEN SATURATION: 96 % | HEART RATE: 99 BPM | RESPIRATION RATE: 18 BRPM | TEMPERATURE: 98.8 F | HEIGHT: 63 IN | BODY MASS INDEX: 39.34 KG/M2 | SYSTOLIC BLOOD PRESSURE: 135 MMHG | DIASTOLIC BLOOD PRESSURE: 76 MMHG | WEIGHT: 222 LBS

## 2019-05-28 DIAGNOSIS — I10 ESSENTIAL HYPERTENSION WITH GOAL BLOOD PRESSURE LESS THAN 140/90: ICD-10-CM

## 2019-05-28 DIAGNOSIS — R73.09 ELEVATED HEMOGLOBIN A1C: ICD-10-CM

## 2019-05-28 DIAGNOSIS — Z01.419 WELL WOMAN EXAM: Primary | ICD-10-CM

## 2019-05-28 DIAGNOSIS — E66.01 SEVERE OBESITY (HCC): ICD-10-CM

## 2019-05-28 DIAGNOSIS — Z01.419 ENCOUNTER FOR WELL WOMAN EXAM WITH ROUTINE GYNECOLOGICAL EXAM: ICD-10-CM

## 2019-05-28 DIAGNOSIS — I10 ESSENTIAL HYPERTENSION: ICD-10-CM

## 2019-05-28 PROCEDURE — 87624 HPV HI-RISK TYP POOLED RSLT: CPT

## 2019-05-28 PROCEDURE — 88175 CYTOPATH C/V AUTO FLUID REDO: CPT

## 2019-05-28 RX ORDER — METFORMIN HYDROCHLORIDE 500 MG/1
TABLET, EXTENDED RELEASE ORAL
Qty: 120 TAB | Refills: 3 | Status: SHIPPED | OUTPATIENT
Start: 2019-05-28 | End: 2019-08-16 | Stop reason: SDUPTHER

## 2019-05-28 RX ORDER — GLIMEPIRIDE 1 MG/1
3 TABLET ORAL
Qty: 270 TAB | Refills: 2 | Status: SHIPPED | OUTPATIENT
Start: 2019-05-28 | End: 2020-03-19 | Stop reason: SDUPTHER

## 2019-05-28 RX ORDER — LISINOPRIL AND HYDROCHLOROTHIAZIDE 12.5; 2 MG/1; MG/1
1 TABLET ORAL DAILY
Qty: 90 TAB | Refills: 2 | Status: SHIPPED | OUTPATIENT
Start: 2019-05-28 | End: 2020-03-19 | Stop reason: SDUPTHER

## 2019-05-28 NOTE — PROGRESS NOTES
Chief Complaint   Patient presents with    Well Woman     1. Have you been to the ER, urgent care clinic since your last visit? Hospitalized since your last visit? No    2. Have you seen or consulted any other health care providers outside of the [address] [place] [address] since your last visit? Include any pap smears or colon screening. No     Patient declined vaccine catch up on Tdap and pneumonia.

## 2019-05-28 NOTE — PROGRESS NOTES
HPI:  Minda Denver is a 57 y.o. female presenting for well woman exam.     Has 10year old daughter and two grandchildren    Has not been checking BP at home    States that she misses doses of her metformin    States that she also misses doses of antihypertensive    GYN Hx: had endometrial ablation -- periods nownot as heavy but states that they are painful ; s/p BTL ;  States gets achy in her hips, flow is much less (does not want to have hysterectomy); pt states hx endometriosis    OB Hx:    Sexually active  S/p two CDs    + stress incontinence    Water drinker    Diet: not following low carb diet    Exercise: always moving in the office; not going to the gym    Allergies- reviewed: Allergies   Allergen Reactions    Shellfish Containing Products Shortness of Breath and Other (comments)     laryngeal edema    Oxycodone Itching     Pt can take lortab    Naprosyn [Naproxen] Swelling     face swells. Takes motrin at home          Medications- reviewed:   Current Outpatient Medications   Medication Sig    metFORMIN ER (GLUCOPHAGE XR) 500 mg tablet TAKE two tabs with lunch and dinner  Indications: type 2 diabetes mellitus    lisinopril-hydroCHLOROthiazide (PRINZIDE, ZESTORETIC) 20-12.5 mg per tablet Take 1 Tab by mouth daily.  glimepiride (AMARYL) 1 mg tablet Take 3 Tabs by mouth every morning.  glucose blood VI test strips (ONETOUCH VERIO) strip Check BS using  test strips with onetouch verio meter    Blood-Glucose Meter monitoring kit by Does Not Apply route. Please supply 1 meter. No current facility-administered medications for this visit. Past Medical History- reviewed:  Past Medical History:   Diagnosis Date    Diabetes mellitus     type 2    Diabetes mellitus type 2 in obese [facility]) 2006    Endometriosis     Essential hypertension     Hypertension     Morbid obesity ([address]     Overweight (BMI 25.0-29. 9)          Past Surgical History- reviewed:   Past Surgical History: Procedure Laterality Date    HX  SECTION  ;    HX CYST REMOVAL      right hand    HX GYN  1998    laparoscopy for endometriosis    HX ORTHOPAEDIC  ,     carpel tunnel    HX TUBAL LIGATION  2013    Dr Erick Panda History - reviewed:  Family History   Problem Relation Age of Onset    Diabetes Mother     Hypertension Mother     High Cholesterol Mother     Hypertension Father     High Cholesterol Father     Diabetes Maternal Grandmother     Breast Cancer Maternal Aunt     Breast Cancer Maternal Aunt     Colon Cancer Neg Hx     Ovarian Cancer Neg Hx          Social History - reviewed:  Social History     Socioeconomic History    Marital status:      Spouse name: Not on file    Number of children: Not on file    Years of education: Not on file    Highest education level: Not on file   Occupational History    Not on file   Social Needs    Financial resource strain: Not on file    Food insecurity:     Worry: Not on file     Inability: Not on file    Transportation needs:     Medical: Not on file     Non-medical: Not on file   Tobacco Use    Smoking status: Never Smoker    Smokeless tobacco: Never Used   Substance and Sexual Activity    Alcohol use: No    Drug use: No    Sexual activity: Yes     Partners: Male     Birth control/protection: None   Lifestyle    Physical activity:     Days per week: Not on file     Minutes per session: Not on file    Stress: Not on file   Relationships    Social connections:     Talks on phone: Not on file     Gets together: Not on file     Attends Jain service: Not on file     Active member of club or organization: Not on file     Attends meetings of clubs or organizations: Not on file     Relationship status: Not on file    Intimate partner violence:     Fear of current or ex partner: Not on file     Emotionally abused: Not on file     Physically abused: Not on file     Forced sexual activity: Not on file   Other Topics Concern     Service Not Asked    Blood Transfusions Not Asked    Caffeine Concern Not Asked    Occupational Exposure Not Asked    Hobby Hazards Not Asked    Sleep Concern Not Asked    Stress Concern Not Asked    Weight Concern Not Asked    Special Diet Not Asked    Back Care Not Asked    Exercise Not Asked    Bike Helmet Not Asked   [address] Yes    Self-Exams Not Asked   Social History Narrative    Graduation from Business school next month; oldest daughter going to college; recently engaged; bought a house in [place] last year         Immunizations - reviewed:   Tdap: declined  Pneumovax: declined    Health Maintenance reviewed -  Pap smear:  7/2016 PAP smear negative, HPV HR negative  Mammogram:  2/2019 negative      Review of Systems   CONSTITUTIONAL: fatigue, weight gain  EYES: Denies: blurry vision  ENT: Denies: hearing loss  CARDIOVASCULAR: Denies: chest pain, dyspnea on exertion  RESPIRATORY: Denies: cough  PSYCH: Denies: anxiety, depression  BREASTS: No masses or nipple discharge      Physical Exam  Visit Vitals  /76   Pulse 99   Temp 98.8 °F (37.1 °C) (Oral)   Resp 18   Ht 5' 3\" (1.6 m)   Wt 222 lb (100.7 kg)   LMP 05/20/2019   SpO2 96%   BMI 39.33 kg/m²       General appearance - alert, awake, well appearing, and in no distress   Eyes - pupils equal and reactive, EOMI intact  Ears - external ear canals without redness, TMs normal    Nose - normal and patent, no erythema, discharge, or polyps  Mouth - mucous membranes moist, pharynx normal without lesions  Neck - supple, no significant adenopathy   Chest - clear to auscultation, no wheezes, rales or rhonchi, symmetric air entry   Heart - normal rate, regular rhythm, normal S1S2, no murmurs   Abdomen - soft, obese, nontender, nondistended, no masses or organomegaly  Neurological - alert, oriented, normal speech, no focal findings or movement disorder noted  Musculoskeletal - no joint tenderness, deformity or swelling   Extremities - peripheral pulses normal, no pedal edema, no clubbing or cyanosis   Skin - normal coloration and turgor, no rashes, no suspicious skin lesions noted  Pelvic - external genitalia normal without rashes or lesion. Pink moist vaginal mucosa. Scant white discharge. Cervix without lesions or abnormal discharge. Uterus non tender and normal size. No adnexal masses or tenderness  Breast - no masses, nontender, no nipple discharge bilaterally         Assessment/Plan:    ICD-10-CM ICD-9-CM    1. Well woman exam Z01.419 V72.31 PAP IG, APTIMA HPV AND RFX 16/18,45 ([id number])   2. Severe obesity ([facility] [address] E66.01 278.01    3. Essential hypertension with goal blood pressure less than 140/90 I10 401.9 lisinopril-hydroCHLOROthiazide (PRINZIDE, ZESTORETIC) 20-12.5 mg per tablet   4. Essential hypertension I10 401.9 lisinopril-hydroCHLOROthiazide (PRINZIDE, ZESTORETIC) 20-12.5 mg per tablet   5. Encounter for well woman exam with routine gynecological exam Z01.419 V72.31 lisinopril-hydroCHLOROthiazide (PRINZIDE, ZESTORETIC) 20-12.5 mg per tablet   6. Elevated hemoglobin A1c R73.09 790.29 lisinopril-hydroCHLOROthiazide (PRINZIDE, ZESTORETIC) 20-12.5 mg per tablet      glimepiride (AMARYL) 1 mg tablet      glucose blood VI test strips (ONETOUCH VERIO) strip       Routine Preventive Care  - Counseled regarding diet, exercise and healthy lifestyle  - BMI 39; recommend exercise and low carb  - Routine lab work - reviewed with patient    Patient encouraged to be compliant with her medications, diet, and exercise. She does not want to see endocrinologist at this time; does not want to change her current medications other than to increase the dosage of her hypoglycemics. I have discussed the diagnosis with the patient and the intended plan as seen in the above orders. Patient verbalized understanding of the plan and agrees with the plan.  The patient has received an after-visit summary and questions were answered concerning future plans. I have discussed medication side effects and warnings with the patient as well. Informed patient to return to the office if new symptoms arise.     Luda Briones MD

## 2019-08-16 ENCOUNTER — TELEPHONE (OUTPATIENT)
Dept: FAMILY MEDICINE CLINIC | Age: 49
End: 2019-08-16

## 2019-08-16 RX ORDER — METFORMIN HYDROCHLORIDE 500 MG/1
TABLET, EXTENDED RELEASE ORAL
Qty: 120 TAB | Refills: 3 | Status: SHIPPED | OUTPATIENT
Start: 2019-08-16 | End: 2020-03-19 | Stop reason: SDUPTHER

## 2019-08-21 NOTE — TELEPHONE ENCOUNTER
Dr. Son/Telephone   Received: Kellie Betancourt, [address] [facility] [address]             Env Patient return call     Caller's first and last name and relationship (if not the patient):       Best contact number(s): [phone]       Whose call is being returned: Returning a missed call from [facility]. Details to clarify the request: Pt requested a detailed message if not able to p/up due to this is her second attempt to return a call.        [facility].

## 2019-08-21 NOTE — TELEPHONE ENCOUNTER
Patient called again and nurse was not available as in patient care as I spoke with the nurse. She said why do you keep calling her and will not leave a message as this is very frustrating to her. She said also she has seen the doctor and everything has been done so she does not know of anything else she needs to do. She said phone tag is also frustrating to her and just send her a my chart message.

## 2019-11-14 RX ORDER — METFORMIN HYDROCHLORIDE 500 MG/1
TABLET, EXTENDED RELEASE ORAL
Qty: 360 TAB | Refills: 1 | OUTPATIENT
Start: 2019-11-14

## 2020-01-21 RX ORDER — METFORMIN HYDROCHLORIDE 500 MG/1
TABLET, EXTENDED RELEASE ORAL
Qty: 360 TAB | Refills: 1 | OUTPATIENT
Start: 2020-01-21

## 2020-02-21 RX ORDER — METFORMIN HYDROCHLORIDE 500 MG/1
TABLET, EXTENDED RELEASE ORAL
Qty: 360 TAB | Refills: 1 | OUTPATIENT
Start: 2020-02-21

## 2020-03-06 ENCOUNTER — TELEPHONE (OUTPATIENT)
Dept: FAMILY MEDICINE CLINIC | Age: 50
End: 2020-03-06

## 2020-03-06 NOTE — TELEPHONE ENCOUNTER
[phone]    Patient called to schedule an appointment with Dr. Maribeth Davis. At this time, she accepted an appointment with Dr. Baltazar Saldaña. She wants to have the lab work done for the diabetic recheck before the doctor's appointment in order for the results to be discussed at the visit. Please advise of doctor response. Thanks.

## 2020-03-10 DIAGNOSIS — R80.9 CONTROLLED TYPE 2 DIABETES MELLITUS WITH MICROALBUMINURIA, WITHOUT LONG-TERM CURRENT USE OF INSULIN (HCC): Primary | ICD-10-CM

## 2020-03-10 DIAGNOSIS — E11.29 CONTROLLED TYPE 2 DIABETES MELLITUS WITH MICROALBUMINURIA, WITHOUT LONG-TERM CURRENT USE OF INSULIN (HCC): Primary | ICD-10-CM

## 2020-03-12 ENCOUNTER — HOSPITAL ENCOUNTER (OUTPATIENT)
Dept: LAB | Age: 50
Discharge: HOME | End: 2020-03-12

## 2020-03-12 ENCOUNTER — HOSPITAL ENCOUNTER (OUTPATIENT)
Dept: MAMMOGRAPHY | Age: 50
Discharge: HOME | End: 2020-03-12
Payer: COMMERCIAL

## 2020-03-12 ENCOUNTER — LAB ONLY (OUTPATIENT)
Dept: FAMILY MEDICINE CLINIC | Age: 50
End: 2020-03-12

## 2020-03-12 DIAGNOSIS — E11.29 CONTROLLED TYPE 2 DIABETES MELLITUS WITH MICROALBUMINURIA, WITHOUT LONG-TERM CURRENT USE OF INSULIN (HCC): ICD-10-CM

## 2020-03-12 DIAGNOSIS — R80.9 CONTROLLED TYPE 2 DIABETES MELLITUS WITH MICROALBUMINURIA, WITHOUT LONG-TERM CURRENT USE OF INSULIN (HCC): ICD-10-CM

## 2020-03-12 DIAGNOSIS — Z12.31 BREAST CANCER SCREENING BY MAMMOGRAM: ICD-10-CM

## 2020-03-12 PROCEDURE — 77063 BREAST TOMOSYNTHESIS BI: CPT

## 2020-03-13 LAB
ANION GAP SERPL CALC-SCNC: 7 MMOL/L (ref 5–15)
BUN SERPL-MCNC: 12 MG/DL (ref 6–20)
BUN/CREAT SERPL: 16 (ref 12–20)
CALCIUM SERPL-MCNC: 9.3 MG/DL (ref 8.5–10.1)
CHLORIDE SERPL-SCNC: 100 MMOL/L (ref 97–108)
CHOLEST SERPL-MCNC: 215 MG/DL
CO2 SERPL-SCNC: 27 MMOL/L (ref 21–32)
CREAT SERPL-MCNC: 0.73 MG/DL (ref 0.55–1.02)
EST. AVERAGE GLUCOSE BLD GHB EST-MCNC: 171 MG/DL
GLUCOSE SERPL-MCNC: 167 MG/DL (ref 65–100)
HBA1C MFR BLD: 7.6 % (ref 4–5.6)
HDLC SERPL-MCNC: 61 MG/DL
HDLC SERPL: 3.5 {RATIO} (ref 0–5)
LDLC SERPL CALC-MCNC: 100.4 MG/DL (ref 0–100)
LIPID PROFILE,FLP: ABNORMAL
POTASSIUM SERPL-SCNC: 4 MMOL/L (ref 3.5–5.1)
SODIUM SERPL-SCNC: 134 MMOL/L (ref 136–145)
TRIGL SERPL-MCNC: 268 MG/DL (ref ?–150)
VLDLC SERPL CALC-MCNC: 53.6 MG/DL

## 2020-03-19 ENCOUNTER — TELEPHONE (OUTPATIENT)
Dept: FAMILY MEDICINE CLINIC | Age: 50
End: 2020-03-19

## 2020-03-19 DIAGNOSIS — I10 ESSENTIAL HYPERTENSION WITH GOAL BLOOD PRESSURE LESS THAN 140/90: ICD-10-CM

## 2020-03-19 DIAGNOSIS — I10 ESSENTIAL HYPERTENSION: ICD-10-CM

## 2020-03-19 DIAGNOSIS — Z01.419 ENCOUNTER FOR WELL WOMAN EXAM WITH ROUTINE GYNECOLOGICAL EXAM: ICD-10-CM

## 2020-03-19 DIAGNOSIS — R73.09 ELEVATED HEMOGLOBIN A1C: ICD-10-CM

## 2020-03-19 RX ORDER — LISINOPRIL AND HYDROCHLOROTHIAZIDE 12.5; 2 MG/1; MG/1
1 TABLET ORAL DAILY
Qty: 90 TAB | Refills: 2 | Status: SHIPPED | OUTPATIENT
Start: 2020-03-19 | End: 2021-01-29 | Stop reason: SDUPTHER

## 2020-03-19 RX ORDER — METFORMIN HYDROCHLORIDE 500 MG/1
TABLET, EXTENDED RELEASE ORAL
Qty: 120 TAB | Refills: 3 | Status: SHIPPED | OUTPATIENT
Start: 2020-03-19 | End: 2020-06-24

## 2020-03-19 RX ORDER — GLIMEPIRIDE 1 MG/1
3 TABLET ORAL
Qty: 270 TAB | Refills: 2 | Status: SHIPPED | OUTPATIENT
Start: 2020-03-19 | End: 2020-12-17

## 2020-03-19 NOTE — TELEPHONE ENCOUNTER
Chanel Shaw was seen at entrance of our clinic. COVID-19 screening performed. They presented with complaints of none. I asked if there are any pertinent issues or medication refills that were needed. Medication refills was sent to Dr Eemly Suarez to refill and call patient in regards to recent lab results. Patient understands that this encounter was a temporary measure, and the importance of further follow up and examination was emphasized. Patient verbalized understanding.        Beth Backquoc

## 2020-03-19 NOTE — TELEPHONE ENCOUNTER
Discussed labs with patient. -218 and A1c remains slightly above goal. ASCVD 7.2%; patient declined statin medication at this time due to past side effects of \"fatty deposits around eyes that went away after stopping medication. \" Patient would like to f/u with Endocrinologist. Amaryl, Metformin, and Lisinopril-HCTZ refilled.

## 2020-03-20 ENCOUNTER — TELEPHONE (OUTPATIENT)
Dept: FAMILY MEDICINE CLINIC | Age: 50
End: 2020-03-20

## 2020-04-01 ENCOUNTER — PATIENT MESSAGE (OUTPATIENT)
Dept: FAMILY MEDICINE CLINIC | Age: 50
End: 2020-04-01

## 2020-04-01 ENCOUNTER — TELEPHONE (OUTPATIENT)
Dept: FAMILY MEDICINE CLINIC | Age: 50
End: 2020-04-01

## 2020-04-01 RX ORDER — METHYLPREDNISOLONE 4 MG/1
TABLET ORAL
Qty: 1 DOSE PACK | Refills: 0 | Status: SHIPPED | OUTPATIENT
Start: 2020-04-01 | End: 2021-01-29

## 2020-04-01 RX ORDER — EPINEPHRINE 0.3 MG/.3ML
0.3 INJECTION SUBCUTANEOUS
Qty: 2 SYRINGE | Refills: 0 | Status: SHIPPED | OUTPATIENT
Start: 2020-04-01 | End: 2020-04-01

## 2020-04-01 NOTE — TELEPHONE ENCOUNTER
The patient called back, was identified by 2 identifiers. Discussed the issue with the patient. The patient reports having severe itching on her neck,torso, arms and legs for the last 4 days. She is unsure which triggered her symptoms. She denies any new creams, body lotions, new food, new medications. She is allergic to shrimp but did not eat any recently. She states that he skin just looks red, previously was having some raised plagues. She was having some wheezing on the first day but it resolved. She hammond snot have any wheezing, chest pain, SOB, cough, lip swelling or trouble swallowing now. She states she \"knows what is the severe allergic reaction\" as she previously had it after shrimp intake. She has been taking Benadryl daily and using topical steroid cream and reports only minimal relief. Discussed with the patient that she likely had hives and recovering now. -Given severe itching and minimal response to Benadryl with try Medrol dose pack, the patient agreed. -Discussed in length that if she develops worsening of the symptoms or start having SOB/lip swelling/chest pain she needs to go to ER, she expressed understanding.  -She has known food allergies and I recommended to have epipen on hands, she agreed. The script for Medrol dose pack and Epinephrine auto injector was sent to the pharmacy.     6:26 PM  4/1/2020  Mando Vance MD

## 2020-04-01 NOTE — TELEPHONE ENCOUNTER
Spoke with Ms. Laura Valdes, states that she has been dealing with this itching since Sunday. She has been breaking out in whelps only on upper body, she feels the itching is internal all over her body. No changes in medications, she even called the pharmacy to see if any changes in formula of medications, no changes in soaps, detergents or dryer sheets. Patient states she has a shellfish allergy but this feels different. Patient states has been taking Benadryl since Sunday which helps to calm the itching for a short time. Patient stated she has a \"little wheezing\" but no difficulty breathing or swallowing. Patient would like to know if a steroid can be prescribed. Patient really prefers not to come into office at this time. Patient said she has taken pictures of rash. Suggested telemed but patient does not have an I-phone. Patient is a long time patient of Dr. Estrella Iglesias. Patient would like a call if possible from a doctor on what step to take. Negative Covid-19 screen. Patient states she has been staying at home.

## 2020-04-01 NOTE — TELEPHONE ENCOUNTER
Call transferred from [facility],    Pt notes allergic reaction and internal itching with whelps. A \"tad\" bit of wheezing. Patient states she's been on the same medication for years.     Placed patient on hold and there was a disconnect while holding    Nurse Kashif Ken aware of call

## 2020-04-01 NOTE — TELEPHONE ENCOUNTER
I called the patient at [phone] to discuss the issue, Left a message to call back the clinic. Based on the note the patient has some issue with wheezing so needs to be evaluated by the physician personally.      4:38 PM  4/1/2020  Derek Hahn MD

## 2020-04-02 ENCOUNTER — TELEPHONE (OUTPATIENT)
Dept: FAMILY MEDICINE CLINIC | Age: 50
End: 2020-04-02

## 2020-04-02 NOTE — TELEPHONE ENCOUNTER
Called to discuss labs. States she wants to see how she does now that she is eating at home more before she tries any different medications or cholesterol meds. States she wants to re establish with Endo when this is all over (in regard to Pandemic). Discussed we could recheck labs in 3 months and see if diet has improved things.     Reese Villaseñor, DO

## 2021-01-14 DIAGNOSIS — R73.09 ELEVATED HEMOGLOBIN A1C: ICD-10-CM

## 2021-01-15 RX ORDER — GLIMEPIRIDE 1 MG/1
TABLET ORAL
Qty: 90 TAB | Refills: 1 | Status: SHIPPED | OUTPATIENT
Start: 2021-01-15 | End: 2021-01-29 | Stop reason: DRUGHIGH

## 2021-01-29 ENCOUNTER — VIRTUAL VISIT (OUTPATIENT)
Dept: FAMILY MEDICINE CLINIC | Age: 51
End: 2021-01-29
Payer: COMMERCIAL

## 2021-01-29 DIAGNOSIS — E11.9 TYPE 2 DIABETES MELLITUS WITHOUT COMPLICATION, WITHOUT LONG-TERM CURRENT USE OF INSULIN (HCC): Primary | ICD-10-CM

## 2021-01-29 DIAGNOSIS — I10 ESSENTIAL HYPERTENSION: ICD-10-CM

## 2021-01-29 PROBLEM — E78.2 MIXED HYPERLIPIDEMIA: Status: ACTIVE | Noted: 2021-01-29

## 2021-01-29 PROCEDURE — 99214 OFFICE O/P EST MOD 30 MIN: CPT | Performed by: STUDENT IN AN ORGANIZED HEALTH CARE EDUCATION/TRAINING PROGRAM

## 2021-01-29 RX ORDER — METFORMIN HYDROCHLORIDE 500 MG/1
TABLET, EXTENDED RELEASE ORAL
Qty: 360 TAB | Refills: 2 | Status: SHIPPED | OUTPATIENT
Start: 2021-01-29

## 2021-01-29 RX ORDER — GLIMEPIRIDE 4 MG/1
4 TABLET ORAL
Qty: 90 TAB | Refills: 1 | Status: SHIPPED | OUTPATIENT
Start: 2021-01-29 | End: 2021-03-15 | Stop reason: DRUGHIGH

## 2021-01-29 RX ORDER — GLIMEPIRIDE 4 MG/1
4 TABLET ORAL
Qty: 60 TAB | Refills: 1 | Status: SHIPPED | OUTPATIENT
Start: 2021-01-29 | End: 2021-01-29

## 2021-01-29 RX ORDER — LISINOPRIL AND HYDROCHLOROTHIAZIDE 12.5; 2 MG/1; MG/1
1 TABLET ORAL DAILY
Qty: 90 TAB | Refills: 3 | Status: SHIPPED | OUTPATIENT
Start: 2021-01-29 | End: 2022-03-09 | Stop reason: SDUPTHER

## 2021-01-29 NOTE — PROGRESS NOTES
[address] Medicine Residency Attending Addendum:  Dr. Lila Huynh MD,  the patient and I were not physically present during this encounter. The resident and I are concurrently monitoring the patient care through appropriate telecommunication technology. I discussed the findings, assessment and plan with the resident and agree with the resident's findings and plan as documented in the resident's note.       Mukesh Quigley MD

## 2021-01-29 NOTE — PROGRESS NOTES
Shabana Johnson  52 y.o. female  1970  [address]:    Telemedicine Progress Note  Lila Huynh MD       Encounter Date and Time: January 29, 2021 at 2:38 PM    Consent: Shabana Johnson, who was seen by synchronous (real-time) audio-video technology, and/or her healthcare decision maker, is aware that this patient-initiated, Telehealth encounter on 1/29/2021 is a billable service, with coverage as determined by her insurance carrier. She is aware that she may receive a bill and has provided verbal consent to proceed: Yes. Chief Complaint   Patient presents with    Diabetes     History of Present Illness   Shabana Johnson is a 52 y.o. female was evaluated by synchronous (real-time) audio-video technology from home, through a secure patient portal.    Patient has a history of Type II DM, HTN, HLD, endometriosis. She presents to f/u DM. DM: A1c 7.6 in 3/2020. Takes Amaryl 3mg  dinner and Metformin 1000mg BID (has been taking Metformin at lunch and dinner; 2-3 times out of the week takes only 1 tablet of Metformin at lunch) . Fasting BG 170s-180s and postprandial -200s. Not always adherent to diabetic diet. Exercise:   Not on statin due to developing fatty deposits in eyes while on medication. States this resolved when the statin was discontinued. Followed by Dr. Richard Smith, Endocrinology. HTN: BP at home ranges 120-130s/80s. Takes Lisinopril-HCTZ 20-12.5mg daily. Denies chest pain or SOB. Notes and labs personally reviewed. Review of Systems   Review of Systems   Constitutional: Negative for chills and fever. HENT: Negative for congestion and sore throat. Eyes: Negative for blurred vision and double vision. Respiratory: Negative for cough and shortness of breath. Cardiovascular: Negative for chest pain and palpitations. Gastrointestinal: Negative for nausea and vomiting. Genitourinary: Negative for dysuria and frequency. Musculoskeletal: Negative for joint pain and myalgias. Neurological: Negative for weakness and headaches. Vitals/Objective:     General: alert, cooperative, no distress   Mental  status: mental status: alert, oriented to person, place, and time, normal mood, behavior, speech, dress, motor activity, and thought processes   Resp: resp: normal effort and no respiratory distress   Neuro: neuro: no gross deficits   Skin: skin: no discoloration or lesions of concern on visible areas   Due to this being a TeleHealth evaluation, many elements of the physical examination are unable to be assessed. Assessment and Plan:     Assessment/Plan:    DM: A1c above goal on current regimen.  -Increase Amaryl to 4mg daily  -Refill Metformin 1000mg BID; discussed taking as prescribed  -Consider switch to GLP1 agonist for better control if needed  -Discussed lifestyle modifications at length  -A1c, CBC, BMP, lipid, urine microalbumin    HTN: BP at goal.  -Refill Lisinopril 20-12.5mg daily  -BMP     Time spent in direct conversation with the patient to include medical condition(s) discussed, assessment and treatment plan:       We discussed the expected course, resolution and complications of the diagnosis(es) in detail. Medication risks, benefits, costs, interactions, and alternatives were discussed as indicated. I advised her to contact the office if her condition worsens, changes or fails to improve as anticipated. She expressed understanding with the diagnosis(es) and plan. Patient understands that this encounter was a temporary measure, and the importance of further follow up and examination was emphasized. Patient verbalized understanding. Patient informed to follow up: 3-6 months. Electronically Signed: Alexander Guillermo MD,  resident    CPT Codes 75860-69621 for Established Patients may apply to this Telehealth Visit. POS code: 18.   Modifier GT    Kev Fly Fay Reynaga is a 52 y.o. female who was evaluated by an audio-video encounter for concerns as above. Patient identification was verified prior to start of the visit. A caregiver was present when appropriate. Due to this being a TeleHealth encounter (During [facility]-24 public health emergency), evaluation of the following organ systems was limited: Vitals/Constitutional/EENT/Resp/CV/GI//MS/Neuro/Skin/Heme-Lymph-Imm. Pursuant to the emergency declaration under the [facility], [facility] waiver authority and the [employer] Act, this Virtual Visit was conducted, with patient's (and/or legal guardian's) consent, to reduce the patient's risk of exposure to COVID-19 and provide necessary medical care. Services were provided through a synchronous discussion virtually to substitute for in-person clinic visit. I was at home. The patient was at home. History   Patients past medical, surgical and family histories were reviewed and updated. Past Medical History:   Diagnosis Date    Diabetes mellitus     type 2    Diabetes mellitus type 2 in obese [facility]) 2006    Endometriosis     Essential hypertension     Hypertension     Morbid obesity ([facility] [address]     Overweight (BMI 25.0-29. 9)      Past Surgical History:   Procedure Laterality Date    HX  SECTION  ;    HX CYST REMOVAL      right hand    HX GYN  1998    laparoscopy for endometriosis    HX ORTHOPAEDIC  ,     carpel tunnel    HX TUBAL LIGATION  2013    Dr Jose Avelar       Family History   Problem Relation Age of Onset    Diabetes Mother     Hypertension Mother     High Cholesterol Mother     Hypertension Father     High Cholesterol Father     Diabetes Maternal Grandmother     Breast Cancer Maternal Aunt     Breast Cancer Maternal Aunt     Colon Cancer Neg Hx     Ovarian Cancer Neg Hx      Social History     Tobacco Use    Smoking status: Never Smoker    Smokeless tobacco: Never Used   Substance Use Topics    Alcohol use: No    Drug use: No     Patient Active Problem List   Diagnosis Code    DM (diabetes mellitus), type 2 ([facility] [address] E11.9    HTN (hypertension) I10    Endometriosis N80.9    Hypercholesterolemia E78.00    Ganglion cyst M67.40    BMI 39.0-39.9,adult Z68.39    Severe obesity (HCC) E66.01    Mixed hyperlipidemia E78.2          Current Medications/Allergies   Medications and Allergies reviewed:    Current Outpatient Medications   Medication Sig Dispense Refill    glimepiride (AMARYL) 1 mg tablet TAKE 3 TABLETS BY MOUTH EVERY MORNING 90 Tab 1    metFORMIN ER (GLUCOPHAGE XR) 500 mg tablet TAKE 2 TABLETS BY MOUTH WITH LUNCH AND DINNER 360 Tab 1    methylPREDNISolone (MEDROL DOSEPACK) 4 mg tablet Per dose pack instructions 1 Dose Pack 0    lisinopril-hydroCHLOROthiazide (PRINZIDE, ZESTORETIC) 20-12.5 mg per tablet Take 1 Tab by mouth daily. 90 Tab 2    glucose blood VI test strips (ONETOUCH VERIO) strip Check BS using  test strips with onetouch verio meter 100 Strip 2    Blood-Glucose Meter monitoring kit by Does Not Apply route. Please supply 1 meter. 1 Kit 0     Allergies   Allergen Reactions    Shellfish Containing Products Shortness of Breath and Other (comments)     laryngeal edema    Oxycodone Itching     Pt can take lortab    Naprosyn [Naproxen] Swelling     face swells.  Takes motrin at home

## 2021-02-01 ENCOUNTER — LAB ONLY (OUTPATIENT)
Dept: FAMILY MEDICINE CLINIC | Age: 51
End: 2021-02-01

## 2021-02-01 DIAGNOSIS — E11.9 TYPE 2 DIABETES MELLITUS WITHOUT COMPLICATION, WITHOUT LONG-TERM CURRENT USE OF INSULIN (HCC): ICD-10-CM

## 2021-02-02 LAB
ANION GAP SERPL CALC-SCNC: 8 MMOL/L (ref 5–15)
BUN SERPL-MCNC: 10 MG/DL (ref 6–20)
BUN/CREAT SERPL: 13 (ref 12–20)
CALCIUM SERPL-MCNC: 9.7 MG/DL (ref 8.5–10.1)
CHLORIDE SERPL-SCNC: 103 MMOL/L (ref 97–108)
CHOLEST SERPL-MCNC: 220 MG/DL
CO2 SERPL-SCNC: 27 MMOL/L (ref 21–32)
COMMENT, HOLDF: NORMAL
CREAT SERPL-MCNC: 0.77 MG/DL (ref 0.55–1.02)
ERYTHROCYTE [DISTWIDTH] IN BLOOD BY AUTOMATED COUNT: 12.8 % (ref 11.5–14.5)
EST. AVERAGE GLUCOSE BLD GHB EST-MCNC: 163 MG/DL
GLUCOSE SERPL-MCNC: 143 MG/DL (ref 65–100)
HBA1C MFR BLD: 7.3 % (ref 4–5.6)
HCT VFR BLD AUTO: 40 % (ref 35–47)
HDLC SERPL-MCNC: 72 MG/DL
HDLC SERPL: 3.1 {RATIO} (ref 0–5)
HGB BLD-MCNC: 13.6 G/DL (ref 11.5–16)
LDLC SERPL CALC-MCNC: 126.8 MG/DL (ref 0–100)
LIPID PROFILE,FLP: ABNORMAL
MCH RBC QN AUTO: 31.2 PG (ref 26–34)
MCHC RBC AUTO-ENTMCNC: 34 G/DL (ref 30–36.5)
MCV RBC AUTO: 91.7 FL (ref 80–99)
NRBC # BLD: 0 K/UL (ref 0–0.01)
NRBC BLD-RTO: 0 PER 100 WBC
PLATELET # BLD AUTO: 269 K/UL (ref 150–400)
PMV BLD AUTO: 11.1 FL (ref 8.9–12.9)
POTASSIUM SERPL-SCNC: 4 MMOL/L (ref 3.5–5.1)
RBC # BLD AUTO: 4.36 M/UL (ref 3.8–5.2)
SAMPLES BEING HELD,HOLD: NORMAL
SODIUM SERPL-SCNC: 138 MMOL/L (ref 136–145)
TRIGL SERPL-MCNC: 106 MG/DL (ref ?–150)
VLDLC SERPL CALC-MCNC: 21.2 MG/DL
WBC # BLD AUTO: 10.3 K/UL (ref 3.6–11)

## 2021-02-03 NOTE — PROGRESS NOTES
A1c slightly above goal. Oral med adjusted last visit. LDL above goal; will discuss restarting statin, although patient has had \"fatty eye deposits\"  Lifestyle modifications. Hyperglycemia on BMP. CBC nml.

## 2021-03-14 ENCOUNTER — PATIENT MESSAGE (OUTPATIENT)
Dept: FAMILY MEDICINE CLINIC | Age: 51
End: 2021-03-14

## 2021-03-15 RX ORDER — GLIMEPIRIDE 2 MG/1
2 TABLET ORAL 2 TIMES DAILY
Qty: 180 TAB | Refills: 1 | Status: SHIPPED | OUTPATIENT
Start: 2021-03-15

## 2021-07-20 ENCOUNTER — TELEPHONE (OUTPATIENT)
Dept: FAMILY MEDICINE CLINIC | Age: 51
End: 2021-07-20

## 2021-07-20 NOTE — TELEPHONE ENCOUNTER
Record request rec'd of [facility] for records. Faxed to Maru  to process.

## 2021-08-11 NOTE — PROGRESS NOTES
All lab results in relation to your diabetes are elevated  Please make an appointment ASAP to discuss None

## 2022-02-21 LAB — HBA1C MFR BLD HPLC: 7.1 %

## 2022-03-09 ENCOUNTER — TELEPHONE (OUTPATIENT)
Dept: FAMILY MEDICINE CLINIC | Age: 52
End: 2022-03-09

## 2022-03-09 NOTE — TELEPHONE ENCOUNTER
Called patient to set up an appointment per Dr. Cerda Nurse for a blood pressure check. Left vm for patient to give us a call back.

## 2022-03-11 ENCOUNTER — OFFICE VISIT (OUTPATIENT)
Dept: FAMILY MEDICINE CLINIC | Age: 52
End: 2022-03-11
Payer: COMMERCIAL

## 2022-03-11 VITALS
RESPIRATION RATE: 17 BRPM | DIASTOLIC BLOOD PRESSURE: 75 MMHG | OXYGEN SATURATION: 96 % | HEART RATE: 98 BPM | TEMPERATURE: 98.5 F | HEIGHT: 63 IN | SYSTOLIC BLOOD PRESSURE: 116 MMHG | WEIGHT: 215 LBS | BODY MASS INDEX: 38.09 KG/M2

## 2022-03-11 DIAGNOSIS — E66.01 SEVERE OBESITY (BMI 35.0-39.9) WITH COMORBIDITY (HCC): ICD-10-CM

## 2022-03-11 DIAGNOSIS — I10 PRIMARY HYPERTENSION: Primary | ICD-10-CM

## 2022-03-11 DIAGNOSIS — E53.8 VITAMIN B12 DEFICIENCY: ICD-10-CM

## 2022-03-11 DIAGNOSIS — E55.9 VITAMIN D DEFICIENCY: ICD-10-CM

## 2022-03-11 PROCEDURE — 99214 OFFICE O/P EST MOD 30 MIN: CPT | Performed by: STUDENT IN AN ORGANIZED HEALTH CARE EDUCATION/TRAINING PROGRAM

## 2022-03-11 RX ORDER — ERGOCALCIFEROL 1.25 MG/1
1.25 CAPSULE ORAL
COMMUNITY
End: 2022-03-14 | Stop reason: SDUPTHER

## 2022-03-11 RX ORDER — LANOLIN ALCOHOL/MO/W.PET/CERES
1000 CREAM (GRAM) TOPICAL DAILY
COMMUNITY
End: 2022-03-14 | Stop reason: SDUPTHER

## 2022-03-11 RX ORDER — FLUTICASONE PROPIONATE 50 MCG
SPRAY, SUSPENSION (ML) NASAL
COMMUNITY

## 2022-03-11 NOTE — PROGRESS NOTES
[address] [facility] [address]   Office [phone], Fax [phone]    Subjective:     Chief Complaint   Patient presents with    Hypertension     History provided by patient     HPI:  Orly Bautista is a 54 y.o. BLACK/ female with medical history of T2DM, HTN, HLD, and Obesity presents for   Chief Complaint   Patient presents with    Hypertension       Ms. Hernandez presents to clinic for follow up of HTN. She is doing well on Lisinopril-HCTZ 20-12.5mg daily. She does not frequently check her BP at home but when she does it is in range. She does not have any side-effects from her medication and is coming in since she has not been seen and would like to get future refills. She does not have any chest pain, shortness of breath, headaches, blurry vision, or any other symptoms or elevated BP.     Social History     Socioeconomic History    Marital status:      Spouse name: Not on file    Number of children: Not on file    Years of education: Not on file    Highest education level: Not on file   Occupational History    Not on file   Tobacco Use    Smoking status: Never Smoker    Smokeless tobacco: Never Used   Substance and Sexual Activity    Alcohol use: No    Drug use: No    Sexual activity: Yes     Partners: Male     Birth control/protection: None   Other Topics Concern     Service Not Asked    Blood Transfusions Not Asked    Caffeine Concern Not Asked    Occupational Exposure Not Asked    Hobby Hazards Not Asked    Sleep Concern Not Asked    Stress Concern Not Asked    Weight Concern Not Asked    Special Diet Not Asked    Back Care Not Asked    Exercise Not Asked    Bike Helmet Not Asked   [address] Yes    Self-Exams Not Asked   Social History Narrative    Graduation from Business school next month; oldest daughter going to college; recently engaged; bought a house in [facility] last year     Social Determinants of Health Financial Resource Strain:     Difficulty of Paying Living Expenses: Not on file   Food Insecurity:     Worried About Running Out of Food in the Last Year: Not on file    Yvette of Food in the Last Year: Not on file   Transportation Needs:     Lack of Transportation (Medical): Not on file    Lack of Transportation (Non-Medical): Not on file   Physical Activity:     Days of Exercise per Week: Not on file    Minutes of Exercise per Session: Not on file   Stress:     Feeling of Stress : Not on file   Social Connections:     Frequency of Communication with Friends and Family: Not on file    Frequency of Social Gatherings with Friends and Family: Not on file    Attends [employer] Services: Not on file    Active Member of [address] [place] [address] or Organizations: Not on file    Attends Club or Organization Meetings: Not on file    Marital Status: Not on file   Intimate Partner Violence:     Fear of Current or Ex-Partner: Not on file    Emotionally Abused: Not on file    Physically Abused: Not on file    Sexually Abused: Not on file   Housing Stability:     Unable to Pay for Housing in the Last Year: Not on file    Number of [place] in the Last Year: Not on file    Unstable Housing in the Last Year: Not on file     Review of Systems   Constitutional: Negative for chills and fever. Respiratory: Negative for cough and shortness of breath. Cardiovascular: Negative for chest pain and leg swelling. Gastrointestinal: Negative for abdominal pain, constipation, diarrhea, nausea and vomiting. Genitourinary: Negative for dysuria, frequency and urgency. Skin: Negative for itching and rash. Neurological: Negative for dizziness and headaches. Objective:     Visit Vitals  /75   Pulse 98   Temp 98.5 °F (36.9 °C) (Oral)   Resp 17   Ht 5' 3\" (1.6 m)   Wt 215 lb (97.5 kg)   SpO2 96%   BMI 38.09 kg/m²      Physical Exam  Constitutional:       Appearance: Normal appearance.    HENT:      Head: Normocephalic and atraumatic. Cardiovascular:      Rate and Rhythm: Normal rate and regular rhythm. Pulses: Normal pulses. Heart sounds: Normal heart sounds. Pulmonary:      Effort: Pulmonary effort is normal.      Breath sounds: Normal breath sounds. Abdominal:      General: Abdomen is flat. Bowel sounds are normal.      Palpations: Abdomen is soft. Skin:     General: Skin is warm and dry. Neurological:      General: No focal deficit present. Mental Status: She is alert and oriented to person, place, and time. Assessment and orders:       ICD-10-CM ICD-9-CM    1. Primary hypertension  I10 401.9    2. Vitamin B12 deficiency  E53.8 266.2 cyanocobalamin (Vitamin B-12) 1,000 mcg tablet   3. Vitamin D deficiency  E55.9 268.9 ergocalciferol (Vitamin D2) 1,250 mcg (50,000 unit) capsule   4. Severe obesity (BMI 35.0-39. 9) with comorbidity ([facility] [address]  E66.01 278.01      1. HTN: BP today 116/75, does not frequently check BP at home but when she does she is in the same range, no side-effects from medication  - Continue Lisnopril-HCTZ 20-12.5mg daily    2. Vitamin B12 Deficiency: per patient report from outside clinic  - Patient will obtain records to let us know when it was last checked so we can check it as needed  - Currently on Vitamin B12 1000mcg daily  - Refilled Vitamin B12    3. Vitamin D Deficiency: per patient report form outside clinic  - Patient will obtain records to let us know when it was last checked so we can check it as needed  - Currently on Vitamin D2 50,000U every 7 days  - Refilled Vitamin D2     Labs, imaging and immunization ordered as above    Follow Up: in 3 months for Wellness Examination    Pt was discussed with Dr. Cesar Moise (attending physician). I have reviewed patient medical and social history and medications. I have reviewed pertinent labs results and other data. I have discussed the diagnosis with the patient and the intended plan as seen in the above orders.  The patient has received an after-visit summary and questions were answered concerning future plans. I have discussed medication side effects and warnings with the patient as well.     Dorita Gomes MD  Resident [facility]  03/14/22

## 2022-03-11 NOTE — PROGRESS NOTES
Identified pt with two pt identifiers(name and ). Reviewed record in preparation for visit and have obtained necessary documentation. No chief complaint on file. There were no vitals filed for this visit. Health Maintenance Due   Topic    Hepatitis C Screening     Depression Screen     Pneumococcal 0-64 years (1 of 2 - PPSV23)    DTaP/Tdap/Td series (1 - Tdap)    Colorectal Cancer Screening Combo     A1C test (Diabetic or Prediabetic)     Lipid Screen     COVID-19 Vaccine (2 - Moderna 3-dose series)       Coordination of Care Questionnaire:  :   1) Have you been to an emergency room, urgent care, or hospitalized since your last visit? If yes, where when, and reason for visit? no       2. Have seen or consulted any other health care provider since your last visit? If yes, where when, and reason for visit? Yes: Kristina  Endocrinology and [facility]- routine check-ups    Patient is accompanied by self I have received verbal consent from Fernandez Nguyen to discuss any/all medical information while they are present in the room.

## 2022-03-14 PROBLEM — E55.9 VITAMIN D DEFICIENCY: Status: ACTIVE | Noted: 2022-03-14

## 2022-03-14 PROBLEM — E66.9 OBESE: Status: ACTIVE | Noted: 2019-05-28

## 2022-03-14 RX ORDER — LANOLIN ALCOHOL/MO/W.PET/CERES
1000 CREAM (GRAM) TOPICAL DAILY
Qty: 90 TABLET | Refills: 1 | Status: SHIPPED | OUTPATIENT
Start: 2022-03-14 | End: 2022-07-28 | Stop reason: SDUPTHER

## 2022-03-14 RX ORDER — ERGOCALCIFEROL 1.25 MG/1
50000 CAPSULE ORAL
Qty: 13 CAPSULE | Refills: 0 | Status: SHIPPED | OUTPATIENT
Start: 2022-03-14 | End: 2022-06-13

## 2022-03-19 PROBLEM — E66.9 OBESE: Status: ACTIVE | Noted: 2019-05-28

## 2022-03-19 PROBLEM — E78.2 MIXED HYPERLIPIDEMIA: Status: ACTIVE | Noted: 2021-01-29

## 2022-03-19 PROBLEM — E55.9 VITAMIN D DEFICIENCY: Status: ACTIVE | Noted: 2022-03-14

## 2022-04-07 ENCOUNTER — OFFICE VISIT (OUTPATIENT)
Dept: FAMILY MEDICINE CLINIC | Age: 52
End: 2022-04-07
Payer: COMMERCIAL

## 2022-04-07 VITALS
HEIGHT: 63 IN | DIASTOLIC BLOOD PRESSURE: 80 MMHG | OXYGEN SATURATION: 95 % | WEIGHT: 214 LBS | SYSTOLIC BLOOD PRESSURE: 120 MMHG | BODY MASS INDEX: 37.92 KG/M2

## 2022-04-07 DIAGNOSIS — E11.9 TYPE 2 DIABETES MELLITUS WITHOUT COMPLICATION, WITHOUT LONG-TERM CURRENT USE OF INSULIN (HCC): ICD-10-CM

## 2022-04-07 DIAGNOSIS — E55.9 VITAMIN D DEFICIENCY: ICD-10-CM

## 2022-04-07 DIAGNOSIS — Z00.00 WELL WOMAN EXAM (NO GYNECOLOGICAL EXAM): Primary | ICD-10-CM

## 2022-04-07 DIAGNOSIS — E53.8 VITAMIN B12 DEFICIENCY: ICD-10-CM

## 2022-04-07 DIAGNOSIS — I10 HYPERTENSION, UNSPECIFIED TYPE: ICD-10-CM

## 2022-04-07 DIAGNOSIS — Z12.11 COLON CANCER SCREENING: ICD-10-CM

## 2022-04-07 PROCEDURE — 99396 PREV VISIT EST AGE 40-64: CPT | Performed by: FAMILY MEDICINE

## 2022-04-07 PROCEDURE — 90715 TDAP VACCINE 7 YRS/> IM: CPT | Performed by: FAMILY MEDICINE

## 2022-04-07 NOTE — PROGRESS NOTES
Abundio Banegas is a 54 y.o. female    Chief Complaint   Patient presents with    Complete Physical       1. Have you been to the ER, urgent care clinic since your last visit? Hospitalized since your last visit? No  2. Have you seen or consulted any other health care providers outside of the [address] [place] [address] since your last visit? Include any pap smears or colon screening.  No    Visit Vitals  /80   Ht 5' 3\" (1.6 m)   Wt 214 lb (97.1 kg)   SpO2 95%   BMI 37.91 kg/m²       Health Maintenance Due   Topic Date Due    Hepatitis C Screening  Never done    Pneumococcal 0-64 years (1 of 2 - PPSV23) Never done    DTaP/Tdap/Td series (1 - Tdap) Never done    Colorectal Cancer Screening Combo  Never done    A1C test (Diabetic or Prediabetic)  02/01/2022    Lipid Screen  02/01/2022       [facility] Cherrie Mortimer, sees frequently had labs   Hamzah Law recently retire

## 2022-04-07 NOTE — PROGRESS NOTES
HPI:  Kalyan Bello is a 54 y.o. female presenting for well woman exam.     Acute complaints:   - Discussed concerns about nerve issues. - Diabetes followed by Dr. Lauro Cordon. A1c 7.1 most recently.  - HTN. Taking Lisinopril-HCTZ.    - GYN was previously following her Vit D and B12    Exercise: None    Diet: Smaller portions, less food, lots of water, fruits/ veges    GYN:  Previously followed by GYN. They recently retired but last saw them 2 mo ago. 3 most recent PHQ Screens 3/11/2022   Little interest or pleasure in doing things Not at all   Feeling down, depressed, irritable, or hopeless Not at all   Total Score PHQ 2 0        Health Maintenance - reviewed:  Pap (age 21-65): This year. Normal per patient. Mammogram (age 54-69): Last mammo 3/2020 in our system - normal.     Colonoscopy (age 54-65): Due. Needs GI referral    Allergies- reviewed: Allergies   Allergen Reactions    Shellfish Containing Products Shortness of Breath and Other (comments)     laryngeal edema    Oxycodone Itching     Pt can take lortab    Hydrocodone Itching and Unknown (comments)     Pt is ALLERGIC TO OXYCODONE:  itchings    Naproxen Swelling and Unknown (comments)     face swells. Takes motrin at home    [employer] Derived Shortness of Breath and Unknown (comments)         Medications- reviewed:   Current Outpatient Medications   Medication Sig    ergocalciferol (Vitamin D2) 1,250 mcg (50,000 unit) capsule Take 1 Capsule by mouth every seven (7) days for 91 days.  cyanocobalamin (Vitamin B-12) 1,000 mcg tablet Take 1 Tablet by mouth daily.  semaglutide (OZEMPIC SC) 0.5 mg by SubCUTAneous route every seven (7) days.  lisinopril-hydroCHLOROthiazide (PRINZIDE, ZESTORETIC) 20-12.5 mg per tablet Take 1 Tablet by mouth daily.  glimepiride (AMARYL) 2 mg tablet Take 1 Tab by mouth two (2) times a day.  metFORMIN ER (GLUCOPHAGE XR) 500 mg tablet Take two tablets twice daily.     fluticasone propionate (Flonase Allergy Relief) 50 mcg/actuation nasal spray daily as needed. No current facility-administered medications for this visit. Past Medical History- reviewed:  Past Medical History:   Diagnosis Date    Diabetes mellitus     type 2    Diabetes mellitus type 2 in obese [facility]) 2006    Endometriosis     Essential hypertension     Hypertension     Morbid obesity ([address]     Overweight (BMI 25.0-29. 9)          Past Surgical History- reviewed:   Past Surgical History:   Procedure Laterality Date    HX  SECTION  ;    HX CYST REMOVAL      right hand    HX GYN  1998    laparoscopy for endometriosis    HX ORTHOPAEDIC  ,     carpel tunnel    HX TUBAL LIGATION  2013    Dr Collette Every History- reviewed:  Social History     Socioeconomic History    Marital status:      Spouse name: Not on file    Number of children: Not on file    Years of education: Not on file    Highest education level: Not on file   Occupational History    Not on file   Tobacco Use    Smoking status: Never Smoker    Smokeless tobacco: Never Used   Substance and Sexual Activity    Alcohol use: No    Drug use: No    Sexual activity: Yes     Partners: Male     Birth control/protection: None   Other Topics Concern     Service Not Asked    Blood Transfusions Not Asked    Caffeine Concern Not Asked    Occupational Exposure Not Asked    Hobby Hazards Not Asked    Sleep Concern Not Asked    Stress Concern Not Asked    Weight Concern Not Asked    Special Diet Not Asked    Back Care Not Asked    Exercise Not Asked    Bike Helmet Not Asked    [address] Yes    Self-Exams Not Asked   Social History Narrative    Graduation from Business school next month; oldest daughter going to college; recently engaged; [place] a house in [place] last year     Social Determinants of Health     Financial Resource Strain:     Difficulty of Paying Living Expenses: Not on file   Food Insecurity:     Worried About Running Out of Food in the Last Year: Not on file    Yvette of Food in the Last Year: Not on file   Transportation Needs:     Lack of Transportation (Medical): Not on file    Lack of Transportation (Non-Medical): Not on file   Physical Activity:     Days of Exercise per Week: Not on file    Minutes of Exercise per Session: Not on file   Stress:     Feeling of Stress : Not on file   Social Connections:     Frequency of Communication with Friends and Family: Not on file    Frequency of Social Gatherings with Friends and Family: Not on file    Attends Sabianist Services: Not on file    Active Member of [address] [place] [address] [employer] or Organizations: Not on file    Attends Club or Organization Meetings: Not on file    Marital Status: Not on file   Intimate Partner Violence:     Fear of Current or Ex-Partner: Not on file    Emotionally Abused: Not on file    Physically Abused: Not on file    Sexually Abused: Not on file   Housing Stability:     Unable to Pay for Housing in the Last Year: Not on file    Number of [place] in the Last Year: Not on file    Unstable Housing in the Last Year: Not on file         Immunizations- reviewed:   Immunization History   Administered Date(s) Administered    COVID-19, [place] [facility], Primary or Immunocompromised Series, MRNA, PF, 100mcg/0.5mL 06/17/2021, 02/06/2022    Influenza Vaccine 11/17/2021       Review of systems:  Items bolded if positive. Constitutional: Fever, chills, night sweats, weight loss, lymphadenopathy, fatigue  HEENT: Vision change, eye pain, rhinorrhea, sinus pain, epistaxis, dysphagia, change in hearing, tinnitus, vertigo.    Endocrine: Weight change, heat/ cold intolerance, tremor, insomnia, polyuria, polydipsia, polyphagia, abnl hair growth, nail changes  Cardiovascular: Chest pain, palpitations, syncope, lower extremity edema, orthopnea, paroxysmal nocturnal dyspnea  Pulmonary: Shortness of breath, dyspnea on exertion, cough, hemoptysis, wheezing  GI: Nausea, vomiting, diarrhea, melena, hematochezia, change in appetite, abdominal pain, change in bowel habits or stools  : Dysuria, frequency, urgency, incontinence, hematuria, nocturia  Musculoskeletal: joint swelling or pain, muscle pain, back pain  Skin:  Rash, New/growing/changing skin lesions  Neurologic: Headache, muscle weakness, paresthesias, anesthesia, ataxia, change in speech, change in gait   Psychiatric: depression, anxiety, hallucinations, mi, SI/HI      Physical Exam  Visit Vitals  /80   Ht 5' 3\" (1.6 m)   Wt 214 lb (97.1 kg)   SpO2 95%   BMI 37.91 kg/m²       General appearance - alert, well appearing, and in no distress  Neck - supple, no significant adenopathy  Chest - clear to auscultation, no wheezes, rales or rhonchi, symmetric air entry  Heart - normal rate, regular rhythm, normal S1, S2, no murmurs, rubs, clicks or gallops  Neurological - alert, oriented, normal speech, no focal findings or movement disorder noted  Musculoskeletal - no joint tenderness, deformity or swelling  Extremities - peripheral pulses normal, no pedal edema, no clubbing or cyanosis  Skin - normal coloration and turgor, no rashes, no suspicious skin lesions noted    Assessment/Plan:   Ms. Shira Stephen is a 54 y.o. female presenting for well woman health maintenance visit. · Counseled on importance of healthy diet, regular exercise, healthy lifestyle (i.e. Safe sex practices, seatbelt safety, wearing sunscreen, etc.)    · Pap smear and mammo followed by GYN    · GI referral placed for colonoscopy     · Reviewed labs from Endocrine - CMP, lipid panel, A1c. Ordered CBC, vitamin D and vitamin B12.      · Follow-up: Return for yearly wellness visits      Orders Placed This Encounter    DE IMMUNIZ ADMIN,1 SINGLE/COMB VAC/TOXOID    TETANUS, DIPHTHERIA TOXOIDS AND ACELLULAR PERTUSSIS VACCINE (TDAP), IN INDIVIDS. >=7, IM    VITAMIN B12     Standing Status:   Future     Number of Occurrences:   1     Standing Expiration Date:   4/7/2023    VITAMIN D, 25 HYDROXY     Standing Status:   Future     Number of Occurrences:   1     Standing Expiration Date:   4/8/2023    CBC W/O DIFF     Standing Status:   Future     Number of Occurrences:   1     Standing Expiration Date:   4/7/2023   [facility]     Referral Priority:   Routine     Referral Type:   Consultation     Referral Reason:   Specialty Services Required     Referred to Provider:   Pastora Zuleta MD     Number of Visits Requested:   1         I have discussed the diagnosis with the patient and the intended plan as seen in the above orders. The patient has received an after-visit summary and questions were answered concerning future plans. Informed pt to return to the office if new symptoms arise.       Sonjia Alpers, MD

## 2022-04-07 NOTE — PATIENT INSTRUCTIONS
Muscle Cramps: Care Instructions  Your Care Instructions     A muscle cramp occurs when a muscle tightens up suddenly. A cramp often happens in the legs. A muscle cramp is also called a muscle spasm or a charley horse. Muscle cramps usually last less than a minute. However, the pain may last for several minutes. Leg cramps that occur at night may wake you up. Heavy exercise, dehydration, and being overweight can increase your risk of getting cramps. An imbalance of certain chemicals in your blood, called electrolytes, can also lead to muscle cramps. Pregnant women sometimes get muscle cramps during sleep. Muscle cramps can be treated by stretching and massaging the muscle. If cramps keep coming back, your doctor may prescribe medicine that relaxes your muscles. Follow-up care is a key part of your treatment and safety. Be sure to make and go to all appointments, and call your doctor if you are having problems. It's also a good idea to know your test results and keep a list of the medicines you take. How can you care for yourself at home? · Drink plenty of fluids to prevent dehydration. Choose water and other clear liquids until you feel better. If you have kidney, heart, or liver disease and have to limit fluids, talk with your doctor before you increase the amount of fluids you drink. · Stretch your muscles every day, especially before and after exercise and at bedtime. Regular stretching can relax your muscles and may prevent cramps. · Do not suddenly increase the amount of exercise you get. Increase your exercise a little each week. · When you get a cramp, stretch and massage the muscle. You can also take a warm shower or bath to relax the muscle. A heating pad placed on the muscle can also help. · Take a daily multivitamin supplement. · Ask your doctor if you can take an over-the-counter pain medicine, such as acetaminophen (Tylenol), ibuprofen (Advil, Motrin), or naproxen (Aleve).  Be safe with medicines. Read and follow all instructions on the label. When should you call for help? Watch closely for changes in your health, and be sure to contact your doctor if:    · You get muscle cramps often that do not go away after home treatment.     · Your muscle cramps often wake you up at night.     · You do not get better as expected. Where can you learn more? Go to http://www.pack.com/  Enter I565 in the search box to learn more about \"Muscle Cramps: Care Instructions. \"  Current as of: July 1, 2021               Content Version: 13.2  © 8007-3824 [employer]. Care instructions adapted under license by [employer] (which disclaims liability or warranty for this information). If you have questions about a medical condition or this instruction, always ask your healthcare professional. [address] any warranty or liability for your use of this information.

## 2022-04-07 NOTE — PROGRESS NOTES
Subjective:   Angelita Welch is a 55 yo female who is here to establish care with Dr. Fanny Landon and for ongoing management of her HTN and T2DM. T2DM   - Managed by her Endocrinologist   - Monitors BG at home, averages 130 (fasting)  - Last HgbA1c 7.1 (February 2022)  - Recently lost about 10-11 pounds since beginning Ozempic    Exercise: none, busy with a work from home job  Diet: eating smaller portions since beginning Ozempic (reduced appetite), eats fruits and vegetables, drinks water only     HTN  - Monitors BP irregularly at home  - Normotensive in clinic today  - Takes her lisinopril-HCTZ at night, urinates frequently overnight but this is not bothersome to her. Feels she is too busy during the day to urinate frequently due to the medications. - Occasionally gets muscle cramps when urinating frequently     B12 Deficiency   - Currently on supplementation  - Has experienced tingling and numbness in her extremities, which has improved since supplmentation    Health Maintenance   - Never had colon cancer screening, feels nervous about bowel prep   - Last Tdap unknown, at least 10 years ago   - Due for pneumococcal vaccine, denies today  - Believes she has been screened for Hepatitis C in the past   - Last pap smear May 2019 (negative)    ROS: Feeling generally well. No TIA's or unusual headaches, no dysphagia. No prolonged cough. No dyspnea or chest pain on exertion. No abdominal pain, change in bowel habits, black or bloody stools. No urinary tract symptoms. No new or unusual musculoskeletal symptoms.     Patient Active Problem List   Diagnosis Code    Type 2 diabetes mellitus ([facility] [address] E11.9    Hypertension I10    Endometriosis N80.9    Hypercholesterolemia E78.00    Ganglion cyst M67.40    BMI 39.0-39.9,adult Z68.39    Obese E66.9    Mixed hyperlipidemia E78.2    Vitamin D deficiency E55.9     Medications    Current Outpatient Medications:     ergocalciferol (Vitamin D2) 1,250 mcg (50,000 unit) capsule, Take 1 Capsule by mouth every seven (7) days for 91 days. , Disp: 13 Capsule, Rfl: 0    cyanocobalamin (Vitamin B-12) 1,000 mcg tablet, Take 1 Tablet by mouth daily. , Disp: 90 Tablet, Rfl: 1    semaglutide (OZEMPIC SC), 0.5 mg by SubCUTAneous route every seven (7) days. , Disp: , Rfl:     lisinopril-hydroCHLOROthiazide (PRINZIDE, ZESTORETIC) 20-12.5 mg per tablet, Take 1 Tablet by mouth daily. , Disp: 90 Tablet, Rfl: 3    glimepiride (AMARYL) 2 mg tablet, Take 1 Tab by mouth two (2) times a day., Disp: 180 Tab, Rfl: 1    metFORMIN ER (GLUCOPHAGE XR) 500 mg tablet, Take two tablets twice daily. , Disp: 360 Tab, Rfl: 2    fluticasone propionate (Flonase Allergy Relief) 50 mcg/actuation nasal spray, daily as needed. , Disp: , Rfl:     Allergies   Allergen Reactions    Shellfish Containing Products Shortness of Breath and Other (comments)     laryngeal edema    Oxycodone Itching     Pt can take lortab    Hydrocodone Itching and Unknown (comments)     Pt is ALLERGIC TO OXYCODONE:  itchings    Naproxen Swelling and Unknown (comments)     face swells. Takes motrin at home    [employer] Derived Shortness of Breath and Unknown (comments)     Objective:     Visit Vitals  /80   Ht 5' 3\" (1.6 m)   Wt 214 lb (97.1 kg)   SpO2 95%   BMI 37.91 kg/m²     General: The patient appears well, alert, oriented x 3, in no acute distress  HEENT: Mucus membranes moist. EOMI. Neck: Supple. No lymphadenopathy or thyromegaly. Lungs: Lungs are clear to auscultation in all anterior and posterior fields, good air entry, no wheezes, rhonchi or rales. Normal work of breathing. Cardiac: Normal rate, regular rhythm. No murmurs, gallops, rubs. S1 and S2 present with normal intensity. Abdominal:  Abdomen soft without tenderness, guarding, mass or organomegaly. Extremities: Upper and lower extremities are atraumatic in appearance without tenderness or deformity. No peripheral edema. Peripheral pulses intact.    MSK:  Full range of motion is noted to all joints. No spinal tenderness. Skin: No lesions, rashes, ecchymoses. Neuro: No focal deficits. Psych: Appropriate mood and affect. Breast and Pelvic exams are deferred. Labs from 02/2022        A/G Ratio 1.3   1.2-2.2   Alb/Creat Ratio 85   0-29   Albumin 4.1   3.8-4.8   Albumin, Urine 256.9   Not Estab. Alkaline Phosphatase 48      ALT (SGPT) 11   0-32   AST (SGOT) 10   0-40   Bilirubin, Total 0.4   0.0-1.2   BUN 11   6-24   BUN/Creatinine Ratio 14   9-23   Calcium 9.9   8.7-10.2   Carbon Dioxide, Total 22   20-29   Chloride 102      Cholesterol, Total 205   100-199   Creatinine 0.79   0.57-1.00   Creatinine, Urine 303.9   Not Estab. eGFR If Africn Am 102   >59   eGFR If NonAfricn Am 88   >59   Globulin, Total 3.2   1.5-4.5   Glucose 134   65-99   HDL Cholesterol 56   >39   Hemoglobin A1c 7.1   4.8-5.6   Potassium 4.1   3.5-5.2   Protein, Total 7.3   6.0-8.5   Sodium 141   134-144   Triglycerides 150   0-149   LDL Chol Calc (NIH) 123   0-99   VLDL Cholesterol [place] 26   5-40     Assessment/Plan:   Susan Chow is a 53 yo female who is here to establish care with Dr. Reagan Bautista and for ongoing management of her HTN and T2DM.      T2DM   - Endocrinologist ordered HgbA1C, urine microalbumin/Cr  - Discussed how tight BG control can help improve her neuropathy     HTN  - Continue to monitor BP, normotensive in clinic today (120/80)  - Continue current lisinopril-HCTZ 20-12.5 mg, continue to monitor for symptoms of frequent urination and muscle cramping    B12 Deficiency   - Currently on supplementation  - Ordered B12 level today     Vitamin D Deficiency  - Currently on supplementation  - Ordered vitamin D level today     Health Maintenance  - Endocrinologist ordered CBC, CMP, lipid panel  - Will refer to GI for colonoscopy   - Will give Tdap today  - Denies pneumococcal vaccine    Medical Student Attestation: Acting in my role as the medical student assigned to the care of this patient, I have completed my evaluation in the presence of a licensed physician who will review all of my documentation and appropriately edit prior to signing. *ATTENTION:  This note has been created by a medical student for educational purposes only. Please do not refer to the content of this note for clinical decision-making, billing, or other purposes. Please see attending physicians note to obtain clinical information on this patient. *

## 2022-04-08 LAB
25(OH)D3 SERPL-MCNC: 50.9 NG/ML (ref 30–100)
ERYTHROCYTE [DISTWIDTH] IN BLOOD BY AUTOMATED COUNT: 12.8 % (ref 11.5–14.5)
HCT VFR BLD AUTO: 39.7 % (ref 35–47)
HGB BLD-MCNC: 13.1 G/DL (ref 11.5–16)
MCH RBC QN AUTO: 30.8 PG (ref 26–34)
MCHC RBC AUTO-ENTMCNC: 33 G/DL (ref 30–36.5)
MCV RBC AUTO: 93.2 FL (ref 80–99)
NRBC # BLD: 0 K/UL (ref 0–0.01)
NRBC BLD-RTO: 0 PER 100 WBC
PLATELET # BLD AUTO: 322 K/UL (ref 150–400)
PMV BLD AUTO: 11.3 FL (ref 8.9–12.9)
RBC # BLD AUTO: 4.26 M/UL (ref 3.8–5.2)
VIT B12 SERPL-MCNC: 758 PG/ML (ref 193–986)
WBC # BLD AUTO: 9.9 K/UL (ref 3.6–11)

## 2022-05-09 ENCOUNTER — PATIENT MESSAGE (OUTPATIENT)
Dept: FAMILY MEDICINE CLINIC | Age: 52
End: 2022-05-09

## 2022-05-09 DIAGNOSIS — R20.2 TINGLING OF BOTH FEET: Primary | ICD-10-CM

## 2022-05-11 NOTE — TELEPHONE ENCOUNTER
From: Micaela Burrows MD  To: Mariam Velez  Sent: 5/9/2022 1:55 PM EDT  Subject: Leg Cramping    Hi Ms. Hernandez,    Just checking in from our last visit. You were complaining of leg cramping at the time, so I wanted to follow up on that. How are things going? Is that still an issue?     JT

## 2022-07-28 ENCOUNTER — TELEPHONE (OUTPATIENT)
Dept: FAMILY MEDICINE CLINIC | Age: 52
End: 2022-07-28

## 2022-07-28 DIAGNOSIS — E55.9 VITAMIN D DEFICIENCY: Primary | ICD-10-CM

## 2022-07-28 DIAGNOSIS — E53.8 VITAMIN B12 DEFICIENCY: ICD-10-CM

## 2022-07-28 RX ORDER — LANOLIN ALCOHOL/MO/W.PET/CERES
1000 CREAM (GRAM) TOPICAL DAILY
Qty: 90 TABLET | Refills: 1 | Status: SHIPPED | OUTPATIENT
Start: 2022-07-28

## 2022-07-28 RX ORDER — CYANOCOBALAMIN (VITAMIN B-12) 500 MCG
400 TABLET ORAL DAILY
Qty: 90 TABLET | Refills: 3 | Status: SHIPPED | OUTPATIENT
Start: 2022-07-28

## 2022-07-28 NOTE — TELEPHONE ENCOUNTER
Hello,  Pt is requesting med refill for Vit. D3 and B12. Pt is out of meds. She also sent a [employer] message yesterday.   Please and thank you    -[place]

## 2022-07-28 NOTE — TELEPHONE ENCOUNTER
Wesly Feng  Thank you for refilling so quick  Pt called back again in regards about her vit D3, she have 50,000 units before. Is it ok for her to only take 400 units?   Please advice, thank you    -[facility]

## 2022-07-29 ENCOUNTER — TELEPHONE (OUTPATIENT)
Dept: FAMILY MEDICINE CLINIC | Age: 52
End: 2022-07-29

## 2022-07-29 NOTE — TELEPHONE ENCOUNTER
Hello, I just called pt today, gave her update and notified about her prescription. Pt wanted a clarification and reason about lowering down her Vit. D3 from 50,000 units to 400 Units. It was not communicated from pcp to pt about lowering down Vit. D3 during recent visit. Pt want to talk to Dr Mauri Torres about it once pcp is back in the office.   Please and thank you.      Crissy Mcclure

## 2022-08-01 ENCOUNTER — TELEPHONE (OUTPATIENT)
Dept: FAMILY MEDICINE CLINIC | Age: 52
End: 2022-08-01

## 2022-08-01 NOTE — TELEPHONE ENCOUNTER
----- Message from Javier Wilkins sent at 7/29/2022  1:18 PM EDT -----  Subject: Medication Problem    Medication: cholecalciferol (VITAMIN D3) (400 Units /10 mcg) tab tablet  Dosage: 400 Units  Ordering Provider: Maricruz García    Question/Problem: Patient is concerned about the discrepancy of the   dosages of the VIT D3 that she has asked to recently refill. Another Dr   had answered the message in [facility]. Pharmacy: [id number] - [address]  AT   [address]    ---------------------------------------------------------------------------  --------------  Surya Muse LETA  [phone]; OK to leave message on voicemail  ---------------------------------------------------------------------------  --------------    SCRIPT ANSWERS  Relationship to Patient: Self

## 2022-08-08 ENCOUNTER — OFFICE VISIT (OUTPATIENT)
Dept: NEUROLOGY | Age: 52
End: 2022-08-08
Payer: COMMERCIAL

## 2022-08-08 VITALS
HEIGHT: 63 IN | SYSTOLIC BLOOD PRESSURE: 130 MMHG | OXYGEN SATURATION: 98 % | HEART RATE: 105 BPM | DIASTOLIC BLOOD PRESSURE: 70 MMHG | BODY MASS INDEX: 37.51 KG/M2 | WEIGHT: 211.7 LBS

## 2022-08-08 DIAGNOSIS — R20.2 PARESTHESIA: Primary | ICD-10-CM

## 2022-08-08 PROCEDURE — 99204 OFFICE O/P NEW MOD 45 MIN: CPT | Performed by: PSYCHIATRY & NEUROLOGY

## 2022-08-08 NOTE — PROGRESS NOTES
NEUROLOGY NEW PATIENT OFFICE CONSULTATION      8/8/2022    RE: Anuj Escudero         1970      REFERRED BY:  Katie Jordan MD        CHIEF COMPLAINT:  This is Anuj Escudero is a 54 y.o. 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 HPI:     For the past 1 yr, patient noted burning numbness and pain in both feet, tips of toes to base, symmetric. (-) weakness. (-) lower back pain  (-) incontinence    Sugar was uncontrolled 1 yr ago with HGba1c on the 8 but got better with Ozenpic. Seeing an Endocrinologist.        ROS  (-) fever  (-) rash  All other systems reviewed and are negative    Past Medical Hx  Past Medical History:   Diagnosis Date    Diabetes mellitus     type 2    Diabetes mellitus type 2 in obese ([facility] [address] 2006    Endometriosis     Essential hypertension     Hypertension     Morbid obesity ([facility] [address]     Overweight (BMI 25.0-29. 9)    Dm - 2006    Social Hx  Social History     Socioeconomic History    Marital status:    Tobacco Use    Smoking status: Never    Smokeless tobacco: Never   Substance and Sexual Activity    Alcohol use: No    Drug use: No    Sexual activity: Yes     Partners: Male     Birth control/protection: None   Other Topics Concern    Seat Belt Yes   Social History Narrative    Graduation from Business school next month; oldest daughter going to college; recently engaged; bought a house in [place] last year       Family Hx  Family History   Problem Relation Age of Onset    Diabetes Mother     Hypertension Mother     High Cholesterol Mother     Hypertension Father     High Cholesterol Father     Diabetes Maternal Grandmother     Breast Cancer Maternal Aunt     Breast Cancer Maternal Aunt     Colon Cancer Neg Hx     Ovarian Cancer Neg Hx        ALLERGIES  Allergies   Allergen Reactions    Shellfish Containing Products Shortness of Breath and Other (comments)     laryngeal edema    Oxycodone Itching     Pt can take lortab    Hydrocodone Itching and Unknown (comments)     Pt is ALLERGIC TO OXYCODONE:  itchings    Naproxen Swelling and Unknown (comments)     face swells. Takes motrin at home     Shellfish Derived Shortness of Breath and Unknown (comments)       CURRENT MEDS  Current Outpatient Medications   Medication Sig Dispense Refill    cyanocobalamin (Vitamin B-12) 1,000 mcg tablet Take 1 Tablet by mouth in the morning. 90 Tablet 1    cholecalciferol (VITAMIN D3) (400 Units /10 mcg) tab tablet Take 1 Tablet by mouth in the morning. 90 Tablet 3    semaglutide (OZEMPIC SC) 0.5 mg by SubCUTAneous route every seven (7) days. fluticasone propionate (FLONASE) 50 mcg/actuation nasal spray daily as needed. lisinopril-hydroCHLOROthiazide (PRINZIDE, ZESTORETIC) 20-12.5 mg per tablet Take 1 Tablet by mouth daily. 90 Tablet 3    glimepiride (AMARYL) 2 mg tablet Take 1 Tab by mouth two (2) times a day. 180 Tab 1    metFORMIN ER (GLUCOPHAGE XR) 500 mg tablet Take two tablets twice daily. 360 Tab 2           PREVIOUS WORKUP: (reviewed)  IMAGING:    CT Results (recent):  No results found for this or any previous visit. MRI Results (recent):  No results found for this or any previous visit. IR Results (recent):  No results found for this or any previous visit. VAS/US Results (recent):  No results found for this or any previous visit. LABS (reviewed)  Results for orders placed or performed in visit on 05/18/22   AMB EXT HGBA1C   Result Value Ref Range    Hemoglobin A1c, External 7.1 %       Physical Exam:   Visit Vitals  /70   Pulse (!) 105   Ht 5' 3\" (1.6 m)   Wt 96 kg (211 lb 11.2 oz)   SpO2 98%   BMI 37.50 kg/m²     General:  Alert, cooperative, no distress. Head:  Normocephalic, without obvious abnormality, atraumatic. Eyes:  Conjunctivae/corneas clear.    Lungs:  Heart:   Non labored breathing  Regular rate and rhythm, no carotid bruits   Abdomen:   Soft, non-distended Extremities: Extremities normal, atraumatic, no cyanosis or edema. Pulses: 2+ and symmetric all extremities. Skin: Skin color, texture, turgor normal. No rashes or lesions. Neurologic Exam     Gen: Attention normal             Language: naming, repetition, fluency normal             Memory: intact recent and remote memory  Cranial Nerves:  I: smell Not tested   II: visual fields Full to confrontation   II: pupils Equal, round, reactive to light   II: optic disc No papilledema   III,VII: ptosis none   III,IV,VI: extraocular muscles  Full ROM   V: mastication normal   V: facial light touch sensation  normal   VII: facial muscle function   symmetric   VIII: hearing symmetric   IX: soft palate elevation  normal   XI: trapezius strength  5/5   XI: sternocleidomastoid strength 5/5   XI: neck flexion strength  5/5   XII: tongue  midline     Motor: normal bulk and tone, no tremor              Strength: 5/5 all four extremities  Sensory: dec PP tips of toes in both feet; intact JPS and vibration  Reflexes: 2+ throughout; Down going toes  Coordination: Good FTN and HTS  Gait: normal gait including tandem            Impression:     Enrrique Hu is a 54 y.o. female who  has a past medical history of Diabetes mellitus, Diabetes mellitus type 2 in obese ([address] (2006), Endometriosis, Essential hypertension, Hypertension, Morbid obesity ([address] and Overweight (BMI 25.0-29.9) who   For the past 1 yr, patient noted burning numbness and pain in both feet, tips of toes to base, symmetric. (-) weakness. Sugar was uncontrolled 1 yr ago with HGba1c on the 8 but got better with Ozenpic. Seeing an Endocrinologist.    Considerations include sensory polyneuropathy or small fiber neuropathy due to long history of diabetes aggravated by low Vit B12. RECOMMENDATIONS  1. I had a long discussion with patient. Discussed diagnosis, prognosis, pathophysiology and available treatment. Reviewed test results.  All questions were answered. 2. Blood test for NEERU, ESR, RF, VIt B6, SPEP/KAT, TSH  3. Discussed doing EMG/NCS of both LE. Patient declined for now  4. Trial of alpha lipoic acid 600 mg BID  5. Continue Vit B12 supplement  6. Optimize medical management of diabetes c/o Endocrinologist.      Follow-up and Dispositions    Return in about 1 month (around 9/8/2022).             Thank you for the consultation      Ally Levi MD  Diplomate, American Board of Psychiatry and Neurology  Diplomate, Neuromuscular Medicine  Diplomate, American Board of Electrodiagnostic Medicine        CC: Adan Zurita MD  Fax: [phone]

## 2022-08-08 NOTE — LETTER
8/8/2022    Patient: Bridget Conrad   YOB: 1970   Date of Visit: 8/8/2022     [address]  Gabrielle Nose    Dear Milton Dutton MD,      Thank you for referring Ms. Mari Tavares to [facility] for evaluation. My notes for this consultation are attached. If you have questions, please do not hesitate to call me. I look forward to following your patient along with you.       Sincerely,    Aurelia Lombardo MD

## 2022-08-11 ENCOUNTER — TELEPHONE (OUTPATIENT)
Dept: NEUROLOGY | Age: 52
End: 2022-08-11

## 2022-08-11 LAB
ALBUMIN SERPL ELPH-MCNC: 3.6 G/DL (ref 2.9–4.4)
ALBUMIN/GLOB SERPL: 1.1 {RATIO} (ref 0.7–1.7)
ALPHA1 GLOB SERPL ELPH-MCNC: 0.2 G/DL (ref 0–0.4)
ALPHA2 GLOB SERPL ELPH-MCNC: 0.6 G/DL (ref 0.4–1)
ANA SER QL: NEGATIVE
B-GLOBULIN SERPL ELPH-MCNC: 1.3 G/DL (ref 0.7–1.3)
ERYTHROCYTE [SEDIMENTATION RATE] IN BLOOD BY WESTERGREN METHOD: 12 MM/HR (ref 0–32)
GAMMA GLOB SERPL ELPH-MCNC: 1.4 G/DL (ref 0.4–1.8)
GLOBULIN SER-MCNC: 3.4 G/DL (ref 2.2–3.9)
IGA SERPL-MCNC: 255 MG/DL (ref 87–352)
IGG SERPL-MCNC: 1573 MG/DL (ref 586–1602)
IGM SERPL-MCNC: 44 MG/DL (ref 26–217)
INTERPRETATION SERPL IEP-IMP: NORMAL
KAPPA LC FREE SER-MCNC: 18.7 MG/L (ref 3.3–19.4)
KAPPA LC FREE/LAMBDA FREE SER: 1.31 {RATIO} (ref 0.26–1.65)
LAMBDA LC FREE SERPL-MCNC: 14.3 MG/L (ref 5.7–26.3)
M PROTEIN SERPL ELPH-MCNC: NORMAL G/DL
PLEASE NOTE:, 149534: NORMAL
PROT SERPL-MCNC: 7 G/DL (ref 6–8.5)
RHEUMATOID FACT SERPL-ACNC: <10 IU/ML (ref 0–15)
TSH SERPL DL<=0.005 MIU/L-ACNC: 1.57 UIU/ML (ref 0.45–4.5)
VIT B6 SERPL-MCNC: 9.6 UG/L (ref 3.4–65.2)

## 2022-08-11 NOTE — TELEPHONE ENCOUNTER
Called pt. Verified. Inform pt that Dr. Lesa Couch states that the Vit B6 is in the low normal range and he advise you to take Vit B6 25 mg every day. Inform pt that Dr. Lesa Couch will re evaluate on her follow up which is 9/16/22. Pt verbalizes understanding.

## 2022-08-11 NOTE — TELEPHONE ENCOUNTER
----- Message from Duke Bills MD sent at 8/11/2022 12:58 PM EDT -----  Inform patient Vit B6 is in the low normal range.     P> Advise to take Vit B6 25 mg every day     Will re evaluate on her follow up    EF    ----- Message -----  From: Attila, [facility] Lab Results In  Sent: 8/11/2022   5:37 AM EDT  To: Duke Bills MD

## 2023-02-22 DIAGNOSIS — E53.8 VITAMIN B12 DEFICIENCY: ICD-10-CM

## 2023-02-22 RX ORDER — LANOLIN ALCOHOL/MO/W.PET/CERES
CREAM (GRAM) TOPICAL
Qty: 90 TABLET | Refills: 1 | Status: SHIPPED | OUTPATIENT
Start: 2023-02-22

## 2023-03-14 DIAGNOSIS — I10 ESSENTIAL HYPERTENSION: ICD-10-CM

## 2023-03-14 RX ORDER — LISINOPRIL AND HYDROCHLOROTHIAZIDE 12.5; 2 MG/1; MG/1
TABLET ORAL
Qty: 90 TABLET | Refills: 3 | Status: SHIPPED | OUTPATIENT
Start: 2023-03-14

## 2023-06-22 NOTE — PROGRESS NOTES
I reviewed with the resident the medical history and the resident's findings on the physical examination. I discussed with the resident the patient's diagnosis and concur with the plan. .

## 2023-08-15 RX ORDER — OMEGA-3S/DHA/EPA/FISH OIL/D3 300MG-1000
CAPSULE ORAL
Qty: 90 TABLET | Refills: 1 | Status: SHIPPED | OUTPATIENT
Start: 2023-08-15

## 2023-08-15 NOTE — TELEPHONE ENCOUNTER
Medication Refill Request    Doris Mistry is requesting a refill of the following medication(s):   Requested Prescriptions     Pending Prescriptions Disp Refills    vitamin D3 (CHOLECALCIFEROL) 10 MCG (400 UNIT) TABS tablet [Pharmacy Med Name: VITAMIN D3 400 UNIT TABLET] 90 tablet 1     Sig: TAKE 1 TABLET BY MOUTH EVERY DAY IN THE MORNING      Last provider to prescribe medication: Dr. Joni Potts  Last Date of Medication Prescribed: 07/28/2022   Last Office Visit Date: 04/07/2022  No Upcoming Scheduled Appointment     Lab Results   Component Value Date/Time    VITD25 50.9 04/07/2022 03:00 PM       Please send refill to:    [id number] [address] [place] [address]  Phone: [phone] Fax: [phone]

## 2024-05-31 ENCOUNTER — HOSPITAL ENCOUNTER (OUTPATIENT)
Facility: HOSPITAL | Age: 54
Discharge: HOME | End: 2024-06-03
Payer: COMMERCIAL

## 2024-05-31 ENCOUNTER — TRANSCRIBE ORDERS (OUTPATIENT)
Facility: HOSPITAL | Age: 54
End: 2024-05-31

## 2024-05-31 DIAGNOSIS — Z01.818 PRE-OP EVALUATION: Primary | ICD-10-CM

## 2024-05-31 DIAGNOSIS — Z01.818 PRE-OP EVALUATION: ICD-10-CM

## 2024-05-31 PROCEDURE — 93005 ELECTROCARDIOGRAM TRACING: CPT

## 2024-06-03 LAB
EKG ATRIAL RATE: 103 BPM
EKG DIAGNOSIS: NORMAL
EKG P AXIS: 74 DEGREES
EKG P-R INTERVAL: 158 MS
EKG Q-T INTERVAL: 332 MS
EKG QRS DURATION: 80 MS
EKG QTC CALCULATION (BAZETT): 434 MS
EKG R AXIS: 8 DEGREES
EKG T AXIS: 20 DEGREES
EKG VENTRICULAR RATE: 103 BPM

## 2024-08-27 ENCOUNTER — TELEPHONE (OUTPATIENT)
Age: 54
End: 2024-08-27

## 2024-08-27 NOTE — TELEPHONE ENCOUNTER
----- Message from Sam BAUTISTA sent at 8/27/2024  2:47 PM EDT -----  Regarding: [facility] Appointment Request  E[facility] Appointment Request    Patient needs appointment for E[facility] Appointment Type: Annual Visit.    Patient Requested Dates(s): Mondays and Fridays  Patient Requested Time: After 9am Before 3pm  Provider Name: Lacey Moore MD.    Reason for Appointment Request: Established Patient - No appointments available during search    Patient needs an annual physical with Dr Rahman but upon trying to set an appointment, no schedules yet. Patient said the same for my chart.  --------------------------------------------------------------------------------------------------------------------------    Relationship to Patient: Self     Call Back Information: OK to leave message on voicemail  Preferred Call Back Number: Phone [phone]

## 2024-12-20 ENCOUNTER — OFFICE VISIT (OUTPATIENT)
Age: 54
End: 2024-12-20
Payer: COMMERCIAL

## 2024-12-20 VITALS
WEIGHT: 217.6 LBS | SYSTOLIC BLOOD PRESSURE: 132 MMHG | HEART RATE: 95 BPM | OXYGEN SATURATION: 95 % | TEMPERATURE: 98.6 F | BODY MASS INDEX: 38.55 KG/M2 | RESPIRATION RATE: 16 BRPM | HEIGHT: 63 IN | DIASTOLIC BLOOD PRESSURE: 83 MMHG

## 2024-12-20 DIAGNOSIS — Z00.00 WELL WOMAN EXAM (NO GYNECOLOGICAL EXAM): Primary | ICD-10-CM

## 2024-12-20 DIAGNOSIS — E78.2 MIXED HYPERLIPIDEMIA: ICD-10-CM

## 2024-12-20 DIAGNOSIS — M54.50 ACUTE BILATERAL LOW BACK PAIN WITHOUT SCIATICA: ICD-10-CM

## 2024-12-20 DIAGNOSIS — I10 ESSENTIAL (PRIMARY) HYPERTENSION: ICD-10-CM

## 2024-12-20 DIAGNOSIS — G62.9 NEUROPATHY: ICD-10-CM

## 2024-12-20 DIAGNOSIS — E11.9 TYPE 2 DIABETES MELLITUS WITHOUT COMPLICATION, WITHOUT LONG-TERM CURRENT USE OF INSULIN (HCC): ICD-10-CM

## 2024-12-20 DIAGNOSIS — Z12.11 COLON CANCER SCREENING: ICD-10-CM

## 2024-12-20 LAB
ALBUMIN SERPL-MCNC: 3.6 G/DL (ref 3.5–5)
ALBUMIN/GLOB SERPL: 0.9 (ref 1.1–2.2)
ALP SERPL-CCNC: 73 U/L (ref 45–117)
ALT SERPL-CCNC: 55 U/L (ref 12–78)
ANION GAP SERPL CALC-SCNC: 7 MMOL/L (ref 2–12)
AST SERPL-CCNC: 31 U/L (ref 15–37)
BILIRUB SERPL-MCNC: 0.5 MG/DL (ref 0.2–1)
BUN SERPL-MCNC: 11 MG/DL (ref 6–20)
BUN/CREAT SERPL: 14 (ref 12–20)
CALCIUM SERPL-MCNC: 9.6 MG/DL (ref 8.5–10.1)
CHLORIDE SERPL-SCNC: 102 MMOL/L (ref 97–108)
CHOLEST SERPL-MCNC: 234 MG/DL
CO2 SERPL-SCNC: 29 MMOL/L (ref 21–32)
CREAT SERPL-MCNC: 0.81 MG/DL (ref 0.55–1.02)
EST. AVERAGE GLUCOSE BLD GHB EST-MCNC: 226 MG/DL
FOLATE SERPL-MCNC: 15.9 NG/ML (ref 5–21)
GLOBULIN SER CALC-MCNC: 3.8 G/DL (ref 2–4)
GLUCOSE SERPL-MCNC: 239 MG/DL (ref 65–100)
HBA1C MFR BLD: 9.5 % (ref 4–5.6)
HDLC SERPL-MCNC: 48 MG/DL
HDLC SERPL: 4.9 (ref 0–5)
LDLC SERPL CALC-MCNC: 120.8 MG/DL (ref 0–100)
POTASSIUM SERPL-SCNC: 4.2 MMOL/L (ref 3.5–5.1)
PROT SERPL-MCNC: 7.4 G/DL (ref 6.4–8.2)
SODIUM SERPL-SCNC: 138 MMOL/L (ref 136–145)
TRIGL SERPL-MCNC: 326 MG/DL
VIT B12 SERPL-MCNC: 1723 PG/ML (ref 193–986)
VLDLC SERPL CALC-MCNC: 65.2 MG/DL

## 2024-12-20 PROCEDURE — 3079F DIAST BP 80-89 MM HG: CPT | Performed by: FAMILY MEDICINE

## 2024-12-20 PROCEDURE — 3075F SYST BP GE 130 - 139MM HG: CPT | Performed by: FAMILY MEDICINE

## 2024-12-20 PROCEDURE — 99396 PREV VISIT EST AGE 40-64: CPT | Performed by: FAMILY MEDICINE

## 2024-12-20 RX ORDER — PREGABALIN 50 MG/1
50 CAPSULE ORAL
COMMUNITY

## 2024-12-20 RX ORDER — LIDOCAINE HYDROCHLORIDE 20 MG/ML
SOLUTION OROPHARYNGEAL
COMMUNITY
Start: 2024-12-09

## 2024-12-20 RX ORDER — SEMAGLUTIDE 0.68 MG/ML
INJECTION, SOLUTION SUBCUTANEOUS
COMMUNITY
Start: 2024-10-03

## 2024-12-20 RX ORDER — AMOXICILLIN 500 MG/1
500 TABLET, FILM COATED ORAL EVERY 12 HOURS
COMMUNITY
Start: 2024-12-13

## 2024-12-20 SDOH — ECONOMIC STABILITY: INCOME INSECURITY: HOW HARD IS IT FOR YOU TO PAY FOR THE VERY BASICS LIKE FOOD, HOUSING, MEDICAL CARE, AND HEATING?: NOT HARD AT ALL

## 2024-12-20 SDOH — ECONOMIC STABILITY: FOOD INSECURITY: WITHIN THE PAST 12 MONTHS, THE FOOD YOU BOUGHT JUST DIDN'T LAST AND YOU DIDN'T HAVE MONEY TO GET MORE.: NEVER TRUE

## 2024-12-20 SDOH — ECONOMIC STABILITY: FOOD INSECURITY: WITHIN THE PAST 12 MONTHS, YOU WORRIED THAT YOUR FOOD WOULD RUN OUT BEFORE YOU GOT MONEY TO BUY MORE.: NEVER TRUE

## 2024-12-20 ASSESSMENT — ENCOUNTER SYMPTOMS
ABDOMINAL PAIN: 0
COUGH: 0
SHORTNESS OF BREATH: 0

## 2024-12-20 ASSESSMENT — PATIENT HEALTH QUESTIONNAIRE - PHQ9
1. LITTLE INTEREST OR PLEASURE IN DOING THINGS: NOT AT ALL
2. FEELING DOWN, DEPRESSED OR HOPELESS: NOT AT ALL
SUM OF ALL RESPONSES TO PHQ9 QUESTIONS 1 & 2: 0
SUM OF ALL RESPONSES TO PHQ QUESTIONS 1-9: 0

## 2024-12-20 NOTE — PROGRESS NOTES
Emily Najera is a 54 y.o. female      Chief Complaint   Patient presents with    Annual Exam     - would like labs  - just got over the flu ..wants to know if she should get the flu vaccine       \"Have you been to the ER, urgent care clinic since your last visit?  Hospitalized since your last visit?\"    YES - When: approximately 2  weeks ago.  Where and Why[facility]      “Have you had a colorectal cancer screening such as a colonoscopy/FIT/Cologuard?    NO    No colonoscopy on file  No cologuard on file  No FIT/FOBT on file   No flexible sigmoidoscopy on file        Have you had a mammogram?”   NO    Date of last Mammogram: 10/7/2022            Vitals:    12/20/24 1029   BP: 132/83   Site: Left Upper Arm   Position: Sitting   Cuff Size: Large Adult   Pulse: 95   Resp: 16   Temp: 98.6 °F (37 °C)   TempSrc: Oral   SpO2: 95%   Weight: 98.7 kg (217 lb 9.6 oz)   Height: 1.6 m (5' 3\")            Health Maintenance Due   Topic Date Due    Pneumococcal 0-64 years Vaccine (1 of 2 - PCV) Never done    Depression Screen  Never done    Diabetic Alb to Cr ratio (uACR) test  Never done    Hepatitis B vaccine (1 of 3 - 19+ 3-dose series) Never done    Colorectal Cancer Screen  Never done    Lipids  02/01/2022    GFR test (Diabetes, CKD 3-4, OR last GFR 15-59)  02/01/2022    Shingles vaccine (1 of 2) Never done    A1C test (Diabetic or Prediabetic)  02/21/2023    Flu vaccine (1) 08/01/2024    COVID-19 Vaccine (3 - 2023-24 season) 09/01/2024    Breast cancer screen  10/07/2024         Medication Reconciliation completed, changes noted.  Please  Update medication list.

## 2024-12-20 NOTE — PATIENT INSTRUCTIONS
GI Referrals:    [facility]  Dr. Donnell MD  [phone]  [facility]

## 2024-12-20 NOTE — PROGRESS NOTES
HPI:  Emily Najera is a 54 y.o. female presenting for well woman exam.     Acute complaints:   - Recently getting over the flu    - Diabetic. Concerned she has neuropathy in her feet. Taking B12. Did have testing that confirmed neuropathy. Now feet are more numb. Endocrinologist has put her on Lyrica to help.    GYN:  Followed by GYN     Health Maintenance - reviewed:  Pap (age 21-65): NIML with negative HPV in 2021    Mammogram (age 40-74): Completed per pt    Colonoscopy (age 45-75): Never done, needs referral      Allergies- reviewed:   Allergies   Allergen Reactions    Shellfish Allergy Other (See Comments) and Shortness Of Breath     Other reaction(s): Unknown (comments)  laryngeal edema      Oxycodone Itching     Pt can take lortab    Acetaminophen Itching    Hydrocodone Itching     Other reaction(s): Unknown (comments)  Pt is ALLERGIC TO OXYCODONE:  itchings    Naproxen Swelling     Other reaction(s): Unknown (comments)  face swells. Takes motrin at home     Other          Medications- reviewed:   Current Outpatient Medications   Medication Sig    pyridoxine (B-6) 100 MG tablet Take 1 tablet by mouth daily    OZEMPIC, 0.25 OR 0.5 MG/DOSE, 2 MG/3ML SOPN 0.5 MG SUBCUTANEOUSLY WEEKLY    amoxicillin (AMOXIL) 500 MG tablet Take 1 tablet by mouth in the morning and 1 tablet in the evening. for 7 days.    lidocaine viscous hcl (XYLOCAINE) 2 % SOLN solution TAKE 5 ML VIA MUCOUS MEMBRANE 4 TIMES A DAY AS NEEDED FOR PAIN    diclofenac sodium (VOLTAREN) 1 % GEL Apply 2-4 g topically 4 times daily as needed    pregabalin (LYRICA) 50 MG capsule Take 1 capsule by mouth.    vitamin D3 (CHOLECALCIFEROL) 10 MCG (400 UNIT) TABS tablet TAKE 1 TABLET BY MOUTH EVERY DAY IN THE MORNING    cyanocobalamin 1000 MCG tablet TAKE 1 TABLET BY MOUTH EVERY DAY IN THE MORNING    fluticasone (FLONASE) 50 MCG/ACT nasal spray daily as needed    glimepiride (AMARYL) 2 MG tablet Take by mouth 2 times daily

## 2024-12-21 LAB
CREAT UR-MCNC: 201 MG/DL
MICROALBUMIN UR-MCNC: 134 MG/DL
MICROALBUMIN/CREAT UR-RTO: 667 MG/G (ref 0–30)

## 2025-02-26 ENCOUNTER — OFFICE VISIT (OUTPATIENT)
Age: 55
End: 2025-02-26
Payer: COMMERCIAL

## 2025-02-26 VITALS
DIASTOLIC BLOOD PRESSURE: 69 MMHG | SYSTOLIC BLOOD PRESSURE: 121 MMHG | RESPIRATION RATE: 17 BRPM | HEART RATE: 115 BPM | TEMPERATURE: 98.5 F | WEIGHT: 215.4 LBS | BODY MASS INDEX: 38.16 KG/M2 | HEIGHT: 63 IN | OXYGEN SATURATION: 93 %

## 2025-02-26 DIAGNOSIS — R80.9 URINE TEST POSITIVE FOR MICROALBUMINURIA: ICD-10-CM

## 2025-02-26 DIAGNOSIS — E11.65 TYPE 2 DIABETES MELLITUS WITH HYPERGLYCEMIA, WITHOUT LONG-TERM CURRENT USE OF INSULIN (HCC): Primary | ICD-10-CM

## 2025-02-26 DIAGNOSIS — R00.0 TACHYCARDIA: ICD-10-CM

## 2025-02-26 DIAGNOSIS — M54.32 SCIATIC NERVE PAIN, LEFT: ICD-10-CM

## 2025-02-26 PROCEDURE — 3074F SYST BP LT 130 MM HG: CPT | Performed by: FAMILY MEDICINE

## 2025-02-26 PROCEDURE — 99213 OFFICE O/P EST LOW 20 MIN: CPT | Performed by: FAMILY MEDICINE

## 2025-02-26 PROCEDURE — 3078F DIAST BP <80 MM HG: CPT | Performed by: FAMILY MEDICINE

## 2025-02-26 RX ORDER — CYCLOBENZAPRINE HCL 5 MG
5 TABLET ORAL NIGHTLY PRN
Qty: 30 TABLET | Refills: 0 | Status: SHIPPED | OUTPATIENT
Start: 2025-02-26 | End: 2025-03-28

## 2025-02-26 RX ORDER — TIRZEPATIDE 5 MG/.5ML
INJECTION, SOLUTION SUBCUTANEOUS
COMMUNITY
Start: 2025-02-10

## 2025-02-26 SDOH — ECONOMIC STABILITY: FOOD INSECURITY: WITHIN THE PAST 12 MONTHS, YOU WORRIED THAT YOUR FOOD WOULD RUN OUT BEFORE YOU GOT MONEY TO BUY MORE.: NEVER TRUE

## 2025-02-26 SDOH — ECONOMIC STABILITY: FOOD INSECURITY: WITHIN THE PAST 12 MONTHS, THE FOOD YOU BOUGHT JUST DIDN'T LAST AND YOU DIDN'T HAVE MONEY TO GET MORE.: NEVER TRUE

## 2025-02-26 NOTE — PROGRESS NOTES
Emily Najera is a 54 y.o. female      Chief Complaint   Patient presents with    3 Month Follow-Up     Would like to discuss elevated levels from last lab results        \"Have you been to the ER, urgent care clinic since your last visit?  Hospitalized since your last visit?\"    NO    “Have you seen or consulted any other health care providers outside of [facility] since your last visit?”    NO    “Have you had a colorectal cancer screening such as a colonoscopy/FIT/Cologuard?    NO    No colonoscopy on file  No cologuard on file  No FIT/FOBT on file   No flexible sigmoidoscopy on file        Have you had a mammogram?”   NO    Date of last Mammogram: 10/7/2022            Vitals:    02/26/25 1609   BP: 121/69   Site: Left Upper Arm   Position: Sitting   Cuff Size: Large Adult   Pulse: (!) 115   Resp: 17   Temp: 98.5 °F (36.9 °C)   TempSrc: Oral   SpO2: 93%   Weight: 97.7 kg (215 lb 6.4 oz)   Height: 1.6 m (5' 3\")            Health Maintenance Due   Topic Date Due    Hepatitis B vaccine (1 of 3 - 19+ 3-dose series) Never done    Pneumococcal 50+ years Vaccine (1 of 2 - PCV) Never done    Colorectal Cancer Screen  Never done    Flu vaccine (1) 08/01/2024    Breast cancer screen  10/07/2024    A1C test (Diabetic or Prediabetic)  03/20/2025         Medication Reconciliation completed, changes noted.  Please  Update medication list.

## 2025-02-26 NOTE — PROGRESS NOTES
History of Present Illness:     Chief Complaint   Patient presents with    3 Month Follow-Up     Would like to discuss elevated levels from last lab results        Emily Najera is a 54 y.o. female     3 month follow up    Diabetes still followed by Endocrine  Reports her A1c was 9.5%  Started on Mounjaro instead of Ozempic  Only been on it for a week  Saw a nutritionist     Neuropathy in the feet  Lyrica is helping some    PMH (REVIEWED):  Past Medical History:   Diagnosis Date    Diabetes mellitus (HCC)     type 2    Diabetes mellitus type 2 in obese 2006    Endometriosis     Essential hypertension     Hypertension     Morbid obesity     Overweight (BMI 25.0-29.9)        Current Medications/Allergies (REVIEWED):     Current Outpatient Medications on File Prior to Visit   Medication Sig Dispense Refill    MOUNJARO 5 MG/0.5ML SOAJ 5 mg Subcutaneous weekly for 28 days      pyridoxine (B-6) 100 MG tablet Take 1 tablet by mouth daily      OZEMPIC, 0.25 OR 0.5 MG/DOSE, 2 MG/3ML SOPN 0.5 MG SUBCUTANEOUSLY WEEKLY      lidocaine viscous hcl (XYLOCAINE) 2 % SOLN solution TAKE 5 ML VIA MUCOUS MEMBRANE 4 TIMES A DAY AS NEEDED FOR PAIN      diclofenac sodium (VOLTAREN) 1 % GEL Apply 2-4 g topically 4 times daily as needed      pregabalin (LYRICA) 50 MG capsule Take 1 capsule by mouth.      vitamin D3 (CHOLECALCIFEROL) 10 MCG (400 UNIT) TABS tablet TAKE 1 TABLET BY MOUTH EVERY DAY IN THE MORNING 90 tablet 1    cyanocobalamin 1000 MCG tablet TAKE 1 TABLET BY MOUTH EVERY DAY IN THE MORNING      fluticasone (FLONASE) 50 MCG/ACT nasal spray daily as needed      glimepiride (AMARYL) 2 MG tablet Take by mouth 2 times daily      lisinopril-hydroCHLOROthiazide (PRINZIDE;ZESTORETIC) 20-12.5 MG per tablet Take 1 tablet by mouth daily      metFORMIN (GLUCOPHAGE-XR) 500 MG extended release tablet Take two tablets twice daily.       No current facility-administered medications on file prior to visit.       Allergies   Allergen

## 2025-02-26 NOTE — PATIENT INSTRUCTIONS
GI Referrals:  [facility]  Dr. Jacobo  [phone]  [facility]

## 2025-05-20 ENCOUNTER — OFFICE VISIT (OUTPATIENT)
Age: 55
End: 2025-05-20
Payer: COMMERCIAL

## 2025-05-20 VITALS
SYSTOLIC BLOOD PRESSURE: 118 MMHG | BODY MASS INDEX: 38.02 KG/M2 | WEIGHT: 214.6 LBS | HEART RATE: 86 BPM | OXYGEN SATURATION: 100 % | TEMPERATURE: 98.4 F | RESPIRATION RATE: 14 BRPM | DIASTOLIC BLOOD PRESSURE: 76 MMHG | HEIGHT: 63 IN

## 2025-05-20 DIAGNOSIS — R80.9 URINE TEST POSITIVE FOR MICROALBUMINURIA: ICD-10-CM

## 2025-05-20 DIAGNOSIS — E11.29 TYPE 2 DIABETES MELLITUS WITH DIABETIC MICROALBUMINURIA, WITHOUT LONG-TERM CURRENT USE OF INSULIN (HCC): Primary | ICD-10-CM

## 2025-05-20 DIAGNOSIS — N18.2 CKD (CHRONIC KIDNEY DISEASE) STAGE 2, GFR 60-89 ML/MIN: ICD-10-CM

## 2025-05-20 DIAGNOSIS — R80.9 TYPE 2 DIABETES MELLITUS WITH DIABETIC MICROALBUMINURIA, WITHOUT LONG-TERM CURRENT USE OF INSULIN (HCC): Primary | ICD-10-CM

## 2025-05-20 DIAGNOSIS — I10 PRIMARY HYPERTENSION: ICD-10-CM

## 2025-05-20 PROCEDURE — 3078F DIAST BP <80 MM HG: CPT | Performed by: FAMILY MEDICINE

## 2025-05-20 PROCEDURE — 3074F SYST BP LT 130 MM HG: CPT | Performed by: FAMILY MEDICINE

## 2025-05-20 PROCEDURE — G2211 COMPLEX E/M VISIT ADD ON: HCPCS | Performed by: FAMILY MEDICINE

## 2025-05-20 PROCEDURE — 99214 OFFICE O/P EST MOD 30 MIN: CPT | Performed by: FAMILY MEDICINE

## 2025-05-20 RX ORDER — ESTRADIOL 0.1 MG/G
CREAM VAGINAL DAILY
COMMUNITY
Start: 2025-04-15

## 2025-05-20 ASSESSMENT — PATIENT HEALTH QUESTIONNAIRE - PHQ9
SUM OF ALL RESPONSES TO PHQ QUESTIONS 1-9: 0
1. LITTLE INTEREST OR PLEASURE IN DOING THINGS: NOT AT ALL
SUM OF ALL RESPONSES TO PHQ QUESTIONS 1-9: 0
2. FEELING DOWN, DEPRESSED OR HOPELESS: NOT AT ALL

## 2025-05-20 ASSESSMENT — ENCOUNTER SYMPTOMS: SHORTNESS OF BREATH: 0

## 2025-05-20 NOTE — PATIENT INSTRUCTIONS
GI Referrals:  [facility]  [phone]  [facility]

## 2025-05-20 NOTE — PROGRESS NOTES
Emily Najera is a 54 y.o. female    Chief Complaint   Patient presents with    Kidney function.       \"Have you been to the ER, urgent care clinic since your last visit?  Hospitalized since your last visit?\"    NO    “Have you seen or consulted any other health care providers outside of [facility] since your last visit?”    NO    “Have you had a colorectal cancer screening such as a colonoscopy/FIT/Cologuard?    NO    No colonoscopy on file  No cologuard on file  No FIT/FOBT on file   No flexible sigmoidoscopy on file        Have you had a mammogram?”   NO    Date of last Mammogram: 10/7/2022            There were no vitals filed for this visit.       No data to display              Health Maintenance Due   Topic Date Due    Hepatitis B vaccine (1 of 3 - 19+ 3-dose series) Never done    Pneumococcal 50+ years Vaccine (1 of 2 - PCV) Never done    Colorectal Cancer Screen  Never done    Breast cancer screen  10/07/2024    A1C test (Diabetic or Prediabetic)  03/20/2025       
intolerance to dairy products and has switched to almond milk.    She has completed her mammogram and Pap smear. She has not yet scheduled her colonoscopy.    FAMILY HISTORY  Both of her parents are on statins.       PMH (REVIEWED):  Past Medical History:   Diagnosis Date    Diabetes mellitus (HCC)     type 2    Diabetes mellitus type 2 in obese 2006    Endometriosis     Essential hypertension     Hypertension     Morbid obesity (HCC)     Overweight (BMI 25.0-29.9)        Current Medications/Allergies (REVIEWED):     Current Outpatient Medications on File Prior to Visit   Medication Sig Dispense Refill    estradiol (ESTRACE) 0.1 MG/GM vaginal cream Place vaginally daily      Tirzepatide (MOUNJARO) 7.5 MG/0.5ML SOAJ pen       lidocaine viscous hcl (XYLOCAINE) 2 % SOLN solution TAKE 5 ML VIA MUCOUS MEMBRANE 4 TIMES A DAY AS NEEDED FOR PAIN      diclofenac sodium (VOLTAREN) 1 % GEL Apply 2-4 g topically 4 times daily as needed      pregabalin (LYRICA) 50 MG capsule Take 1 capsule by mouth.      vitamin D3 (CHOLECALCIFEROL) 10 MCG (400 UNIT) TABS tablet TAKE 1 TABLET BY MOUTH EVERY DAY IN THE MORNING 90 tablet 1    fluticasone (FLONASE) 50 MCG/ACT nasal spray daily as needed      glimepiride (AMARYL) 2 MG tablet Take by mouth 2 times daily      lisinopril-hydroCHLOROthiazide (PRINZIDE;ZESTORETIC) 20-12.5 MG per tablet Take 1 tablet by mouth daily      metFORMIN (GLUCOPHAGE-XR) 500 MG extended release tablet Take two tablets twice daily.      pyridoxine (B-6) 100 MG tablet Take 1 tablet by mouth daily (Patient not taking: Reported on 5/20/2025)      cyanocobalamin 1000 MCG tablet TAKE 1 TABLET BY MOUTH EVERY DAY IN THE MORNING (Patient not taking: Reported on 5/20/2025)       No current facility-administered medications on file prior to visit.       Allergies   Allergen Reactions    Shellfish Allergy Other (See Comments) and Shortness Of Breath     Other reaction(s): Unknown (comments)  laryngeal edema      Oxycodone

## 2025-05-21 LAB
ANION GAP SERPL CALC-SCNC: 5 MMOL/L (ref 2–12)
BUN SERPL-MCNC: 12 MG/DL (ref 6–20)
BUN/CREAT SERPL: 14 (ref 12–20)
CALCIUM SERPL-MCNC: 9.5 MG/DL (ref 8.5–10.1)
CHLORIDE SERPL-SCNC: 103 MMOL/L (ref 97–108)
CHOLEST SERPL-MCNC: 202 MG/DL
CO2 SERPL-SCNC: 27 MMOL/L (ref 21–32)
CREAT SERPL-MCNC: 0.85 MG/DL (ref 0.55–1.02)
CREAT UR-MCNC: 175 MG/DL
EST. AVERAGE GLUCOSE BLD GHB EST-MCNC: 197 MG/DL
GLUCOSE SERPL-MCNC: 173 MG/DL (ref 65–100)
HBA1C MFR BLD: 8.5 % (ref 4–5.6)
HDLC SERPL-MCNC: 49 MG/DL
HDLC SERPL: 4.1 (ref 0–5)
LDLC SERPL CALC-MCNC: 111.2 MG/DL (ref 0–100)
MICROALBUMIN UR-MCNC: 16.4 MG/DL
MICROALBUMIN/CREAT UR-RTO: 94 MG/G (ref 0–30)
POTASSIUM SERPL-SCNC: 4.2 MMOL/L (ref 3.5–5.1)
SODIUM SERPL-SCNC: 135 MMOL/L (ref 136–145)
TRIGL SERPL-MCNC: 209 MG/DL
VLDLC SERPL CALC-MCNC: 41.8 MG/DL

## 2025-05-22 ENCOUNTER — RESULTS FOLLOW-UP (OUTPATIENT)
Age: 55
End: 2025-05-22

## 2025-08-20 ENCOUNTER — OFFICE VISIT (OUTPATIENT)
Age: 55
End: 2025-08-20
Payer: COMMERCIAL

## 2025-08-20 VITALS
SYSTOLIC BLOOD PRESSURE: 103 MMHG | BODY MASS INDEX: 38.19 KG/M2 | HEART RATE: 100 BPM | OXYGEN SATURATION: 95 % | TEMPERATURE: 98.5 F | DIASTOLIC BLOOD PRESSURE: 68 MMHG | WEIGHT: 215.6 LBS

## 2025-08-20 DIAGNOSIS — I10 PRIMARY HYPERTENSION: ICD-10-CM

## 2025-08-20 DIAGNOSIS — N18.2 CKD (CHRONIC KIDNEY DISEASE) STAGE 2, GFR 60-89 ML/MIN: ICD-10-CM

## 2025-08-20 DIAGNOSIS — Z79.4 TYPE 2 DIABETES MELLITUS WITHOUT COMPLICATION, WITH LONG-TERM CURRENT USE OF INSULIN (HCC): Primary | ICD-10-CM

## 2025-08-20 DIAGNOSIS — Z78.0 MENOPAUSE: ICD-10-CM

## 2025-08-20 DIAGNOSIS — E11.9 TYPE 2 DIABETES MELLITUS WITHOUT COMPLICATION, WITH LONG-TERM CURRENT USE OF INSULIN (HCC): Primary | ICD-10-CM

## 2025-08-20 DIAGNOSIS — E78.00 HYPERCHOLESTEROLEMIA: ICD-10-CM

## 2025-08-20 PROBLEM — E56.9 VITAMIN DEFICIENCY: Status: ACTIVE | Noted: 2021-05-21

## 2025-08-20 PROCEDURE — 3052F HG A1C>EQUAL 8.0%<EQUAL 9.0%: CPT | Performed by: FAMILY MEDICINE

## 2025-08-20 PROCEDURE — 3074F SYST BP LT 130 MM HG: CPT | Performed by: FAMILY MEDICINE

## 2025-08-20 PROCEDURE — 3078F DIAST BP <80 MM HG: CPT | Performed by: FAMILY MEDICINE

## 2025-08-20 PROCEDURE — 99214 OFFICE O/P EST MOD 30 MIN: CPT | Performed by: FAMILY MEDICINE

## 2025-08-20 RX ORDER — TIRZEPATIDE 10 MG/.5ML
10 INJECTION, SOLUTION SUBCUTANEOUS
COMMUNITY
Start: 2025-08-17

## 2025-08-20 RX ORDER — PROGESTERONE 100 MG/1
100 CAPSULE ORAL DAILY
COMMUNITY
Start: 2025-06-19

## 2025-08-20 RX ORDER — ATORVASTATIN CALCIUM 40 MG/1
40 TABLET, FILM COATED ORAL DAILY
Qty: 90 TABLET | Refills: 1 | Status: SHIPPED | OUTPATIENT
Start: 2025-08-20

## 2025-08-20 ASSESSMENT — LIFESTYLE VARIABLES
HOW OFTEN DO YOU HAVE A DRINK CONTAINING ALCOHOL: NEVER
HOW MANY STANDARD DRINKS CONTAINING ALCOHOL DO YOU HAVE ON A TYPICAL DAY: PATIENT DOES NOT DRINK

## 2025-08-20 ASSESSMENT — PATIENT HEALTH QUESTIONNAIRE - PHQ9
SUM OF ALL RESPONSES TO PHQ QUESTIONS 1-9: 0
SUM OF ALL RESPONSES TO PHQ QUESTIONS 1-9: 0
2. FEELING DOWN, DEPRESSED OR HOPELESS: NOT AT ALL
SUM OF ALL RESPONSES TO PHQ QUESTIONS 1-9: 0
SUM OF ALL RESPONSES TO PHQ QUESTIONS 1-9: 0
1. LITTLE INTEREST OR PLEASURE IN DOING THINGS: NOT AT ALL

## 2025-08-21 LAB
CREAT UR-MCNC: 140 MG/DL (ref 28–217)
MICROALBUMIN UR-MCNC: 5.03 MG/DL
MICROALBUMIN/CREAT UR-RTO: 36 MG/G